# Patient Record
Sex: FEMALE | Race: BLACK OR AFRICAN AMERICAN | NOT HISPANIC OR LATINO | URBAN - METROPOLITAN AREA
[De-identification: names, ages, dates, MRNs, and addresses within clinical notes are randomized per-mention and may not be internally consistent; named-entity substitution may affect disease eponyms.]

---

## 2020-07-25 ENCOUNTER — TELEPHONE ENCOUNTER (OUTPATIENT)
Dept: URBAN - METROPOLITAN AREA CLINIC 13 | Facility: CLINIC | Age: 55
End: 2020-07-25

## 2020-07-25 RX ORDER — FAMOTIDINE 40 MG/1
TAKE 1 TABLET BEDTIME TABLET ORAL
Qty: 30 | Refills: 11 | OUTPATIENT
Start: 2017-08-14 | End: 2019-01-17

## 2020-07-25 RX ORDER — CHOLESTYRAMINE 4 G/9G
MIX 1 SCOOP(5.7 GRAMS) AS DIRECTED AND DRINK TWICE DAILY POWDER, FOR SUSPENSION ORAL
Qty: 378 | Refills: 10 | OUTPATIENT
Start: 2017-10-27 | End: 2019-03-22

## 2020-07-25 RX ORDER — AMITRIPTYLINE HYDROCHLORIDE 10 MG/1
TAKE 1 TABLET AT BEDTIME TABLET, FILM COATED ORAL
Qty: 30 | Refills: 3 | OUTPATIENT
Start: 2016-11-14 | End: 2017-01-12

## 2020-07-25 RX ORDER — HYDROXYZINE HYDROCHLORIDE 10 MG/1
TAKE 1 TABLET 3 TIMES DAILY AS NEEDED TABLET ORAL
Qty: 90 | Refills: 1 | OUTPATIENT
Start: 2017-07-14 | End: 2019-01-17

## 2020-07-25 RX ORDER — OMEPRAZOLE 20 MG/1
TAKE 1 CAPSULE DAILY CAPSULE, DELAYED RELEASE ORAL
Qty: 90 | Refills: 2 | OUTPATIENT
Start: 2018-06-14 | End: 2019-01-17

## 2020-07-25 RX ORDER — CIPROFLOXACIN HYDROCHLORIDE 500 MG/1
TAKE 1 TABLET TWICE DAILY TABLET, FILM COATED ORAL
Qty: 28 | Refills: 0 | OUTPATIENT
Start: 2019-04-18 | End: 2019-10-04

## 2020-07-25 RX ORDER — TIZANIDINE HYDROCHLORIDE 4 MG/1
TAKE 1 TABLET DAILY PRN TABLET ORAL
Refills: 0 | OUTPATIENT
End: 2017-01-12

## 2020-07-25 RX ORDER — CIPROFLOXACIN HYDROCHLORIDE 500 MG/1
TAKE 1 TABLET TWICE DAILY TAKE BY MOUTH TWICE DAILY FOR 14 DAYS TABLET, FILM COATED ORAL
Qty: 28 | Refills: 0 | OUTPATIENT
Start: 2018-06-20 | End: 2019-01-17

## 2020-07-25 RX ORDER — URSODIOL 300 MG/1
TAKE 1 CAPSULE 2-3 TIMES DAILY CAPSULE ORAL
Refills: 0 | OUTPATIENT
End: 2016-07-19

## 2020-07-25 RX ORDER — PREDNISONE 10 MG/1
TAKE 4 TABLET DAILY FOR 2 WEEKS THEN DECREASE 5MG QD PER WEEK TIL FINISHED TABLET ORAL
Qty: 168 | Refills: 0 | OUTPATIENT
Start: 2013-03-20 | End: 2016-01-26

## 2020-07-25 RX ORDER — TRAMADOL HYDROCHLORIDE 50 MG/1
TAKE 1 TABLET DAILY PRN TABLET, COATED ORAL
Refills: 0 | OUTPATIENT
End: 2017-07-14

## 2020-07-25 RX ORDER — NADOLOL 40 MG/1
TAKE 1 TABLET DAILY TABLET ORAL
Qty: 30 | Refills: 10 | OUTPATIENT
Start: 2017-10-27 | End: 2019-01-17

## 2020-07-25 RX ORDER — TAPENTADOL HYDROCHLORIDE 50 MG/1
TK 1 T PO Q 12 H UTD FOR 30 DAYS TABLET, FILM COATED, EXTENDED RELEASE ORAL
Qty: 60 | Refills: 0 | OUTPATIENT
Start: 2018-06-04 | End: 2019-03-22

## 2020-07-25 RX ORDER — PREDNISONE 20 MG/1
TAKE 1 TABLET DAILY AS DIRECTED TABLET ORAL
Qty: 30 | Refills: 0 | OUTPATIENT
Start: 2012-05-24 | End: 2013-03-05

## 2020-07-25 RX ORDER — OBETICHOLIC ACID 5 MG/1
TAKE 1 TABLET DAILY TABLET, FILM COATED ORAL
Qty: 30 | Refills: 3 | OUTPATIENT
Start: 2017-07-14 | End: 2017-10-27

## 2020-07-25 RX ORDER — PREDNISONE 20 MG/1
TAKE 1 TABLET DAILY TABLET ORAL
Qty: 30 | Refills: 2 | OUTPATIENT
Start: 2017-04-07 | End: 2017-10-27

## 2020-07-25 RX ORDER — CIPROFLOXACIN HYDROCHLORIDE 500 MG/1
TAKE 1 TABLET EVERY 12 HOURS DAILY TABLET, FILM COATED ORAL
Qty: 10 | Refills: 0 | OUTPATIENT
Start: 2017-10-06 | End: 2017-10-27

## 2020-07-25 RX ORDER — AMOXICILLIN AND CLAVULANATE POTASSIUM 875; 125 MG/1; MG/1
TAKE 1 TABLET EVERY 12 HOURS DAILY TABLET, FILM COATED ORAL
Qty: 42 | Refills: 0 | OUTPATIENT
Start: 2018-06-14 | End: 2019-01-17

## 2020-07-25 RX ORDER — BUPRENORPHINE HYDROCHLORIDE 75 UG/1
FILM, SOLUBLE BUCCAL
Refills: 0 | OUTPATIENT
End: 2017-04-07

## 2020-07-25 RX ORDER — METRONIDAZOLE 500 MG/1
TAKE 1 TABLET 3 TIMES DAILY UNTIL GONE TABLET ORAL
Qty: 15 | Refills: 0 | OUTPATIENT
Start: 2017-10-06 | End: 2017-10-27

## 2020-07-25 RX ORDER — NAPROXEN SODIUM 220 MG
TAKE 1 TABLET 3 TIMES DAILY AS NEEDED TABLET ORAL
Refills: 0 | OUTPATIENT
End: 2017-07-14

## 2020-07-25 RX ORDER — OXYCODONE AND ACETAMINOPHEN 5; 325 MG/1; MG/1
TAKE TABLET  PRN TABLET ORAL
Refills: 0 | OUTPATIENT
End: 2017-01-12

## 2020-07-25 RX ORDER — POLYETHYLENE GLYCOL 3350, SODIUM CHLORIDE, SODIUM BICARBONATE AND POTASSIUM CHLORIDE WITH LEMON FLAVOR 420; 11.2; 5.72; 1.48 G/4L; G/4L; G/4L; G/4L
TAKE 1/2 GALLON AT 5:00 PM DAY BEFORE PROCEDURE, TAKE SECOND 1/2 OF GALLON 6 HRS PRIOR TO PROCEDURE POWDER, FOR SOLUTION ORAL
Qty: 1 | Refills: 0 | OUTPATIENT
Start: 2019-01-17 | End: 2019-03-22

## 2020-07-25 RX ORDER — HYDROXYZINE HYDROCHLORIDE 25 MG/1
TAKE 1 TABLET 3 TO 4 TIMES DAILY AS NEEDED FOR ITCHING TABLET, FILM COATED ORAL
Qty: 100 | Refills: 3 | OUTPATIENT
Start: 2017-10-27 | End: 2019-01-17

## 2020-07-25 RX ORDER — URSODIOL 500 MG/1
TAKE  TABLET TWICE DAILY TABLET ORAL
Qty: 60 | Refills: 2 | OUTPATIENT
Start: 2012-03-27 | End: 2012-06-07

## 2020-07-25 RX ORDER — POLYETHYLENE GLYCOL 3350, SODIUM SULFATE, SODIUM CHLORIDE, POTASSIUM CHLORIDE, ASCORBIC ACID, SODIUM ASCORBATE 7.5-2.691G
TAKE AS DIRECTED KIT ORAL
Qty: 1 | Refills: 0 | OUTPATIENT
Start: 2012-03-28 | End: 2012-04-12

## 2020-07-25 RX ORDER — PREDNISONE 5 MG/1
TAKE 3 TABLET DAILY 15 MG (3 TAB) DAILY X 1 WEEK, 10 MG (2 TAB) DAILY X 1 WEEK, 5MG (1 TAB) DAILY X 1 WEEK, THEN STOP TABLET ORAL
Qty: 90 | Refills: 1 | OUTPATIENT
Start: 2012-06-07 | End: 2013-03-05

## 2020-07-25 RX ORDER — ONDANSETRON HYDROCHLORIDE 4 MG/1
TAKE 1 TABLET EVERY 6 HOURS PRN TAKE 1-2 TABLETS BY MOUTH EVERY 6 HOURS AS NEEDED FOR NAUSEA TABLET, FILM COATED ORAL
Qty: 40 | Refills: 1 | OUTPATIENT
Start: 2017-11-17 | End: 2019-01-17

## 2020-07-25 RX ORDER — METRONIDAZOLE 500 MG/1
TAKE 1 TABLET 3 TIMES DAILY FOR ONE WEEK TABLET ORAL
Qty: 42 | Refills: 0 | OUTPATIENT
Start: 2019-04-18 | End: 2019-10-04

## 2020-07-26 ENCOUNTER — TELEPHONE ENCOUNTER (OUTPATIENT)
Dept: URBAN - METROPOLITAN AREA CLINIC 13 | Facility: CLINIC | Age: 55
End: 2020-07-26

## 2020-07-26 RX ORDER — TACROLIMUS 0.3 MG/G
APP TO HANDS AND FEET BID PRN OINTMENT TOPICAL
Qty: 30 | Refills: 0 | Status: ACTIVE | COMMUNITY
Start: 2018-09-19

## 2020-07-26 RX ORDER — DUPILUMAB 300 MG/2ML
TAKES ONCE EVERY 2 WEEKS INJECTION, SOLUTION SUBCUTANEOUS
Refills: 0 | Status: ACTIVE | COMMUNITY
Start: 2019-02-16

## 2020-07-26 RX ORDER — NADOLOL 40 MG/1
TAKE 1 TABLET BY MOUTH DAILY TABLET ORAL
Qty: 90 | Refills: 3 | Status: ACTIVE | COMMUNITY
Start: 2019-10-04

## 2020-07-26 RX ORDER — DUPILUMAB 300 MG/2ML
INJECTION, SOLUTION SUBCUTANEOUS
Qty: 4 | Refills: 0 | Status: ACTIVE | COMMUNITY
Start: 2018-12-07

## 2020-07-26 RX ORDER — IBUPROFEN 800 MG/1
TAKE TABLET  PRN TABLET ORAL
Refills: 0 | Status: ACTIVE | COMMUNITY
Start: 2018-02-25

## 2020-07-26 RX ORDER — MOMETASONE FUROATE 1 MG/ML
SOLUTION TOPICAL
Qty: 60 | Refills: 0 | Status: ACTIVE | COMMUNITY
Start: 2012-02-17

## 2020-07-26 RX ORDER — SPIRONOLACTONE 50 MG/1
TABLET, FILM COATED ORAL
Qty: 90 | Refills: 0 | Status: ACTIVE | COMMUNITY
Start: 2018-01-18

## 2020-07-26 RX ORDER — CODEINE PHOSPHATE AND GUAIFENESIN 10; 100 MG/5ML; MG/5ML
LIQUID ORAL
Qty: 200 | Refills: 0 | Status: ACTIVE | COMMUNITY
Start: 2019-10-16

## 2020-07-26 RX ORDER — TERBINAFINE HYDROCHLORIDE 250 MG/1
TABLET ORAL
Qty: 30 | Refills: 0 | Status: ACTIVE | COMMUNITY
Start: 2019-08-09

## 2020-07-26 RX ORDER — CHOLECALCIFEROL (VITAMIN D3) 1250 MCG
TK 1 C PO 1 TIME Q WK CAPSULE ORAL
Qty: 4 | Refills: 0 | Status: ACTIVE | COMMUNITY
Start: 2019-07-10

## 2020-07-26 RX ORDER — DUPILUMAB 300 MG/2ML
INJECTION, SOLUTION SUBCUTANEOUS
Qty: 4 | Refills: 0 | Status: ACTIVE | COMMUNITY
Start: 2018-11-14

## 2020-07-26 RX ORDER — EVOLOCUMAB 140 MG/ML
BI WEEKLY INJECTION, SOLUTION SUBCUTANEOUS
Refills: 0 | Status: ACTIVE | COMMUNITY

## 2020-07-26 RX ORDER — URSODIOL 300 MG/1
TAKE ONE CAPSULE BY MOUTH THREE TIMES DAILY CAPSULE ORAL
Qty: 270 | Refills: 3 | Status: ACTIVE | COMMUNITY
Start: 2016-02-03

## 2020-07-26 RX ORDER — EVOLOCUMAB 140 MG/ML
INJECTION, SOLUTION SUBCUTANEOUS
Qty: 6 | Refills: 0 | Status: ACTIVE | COMMUNITY
Start: 2018-04-03

## 2020-07-26 RX ORDER — PREDNISONE 10 MG/1
TABLET ORAL
Qty: 21 | Refills: 0 | Status: ACTIVE | COMMUNITY
Start: 2018-01-30

## 2020-07-26 RX ORDER — EVOLOCUMAB 140 MG/ML
INJECTION, SOLUTION SUBCUTANEOUS
Qty: 6 | Refills: 0 | Status: ACTIVE | COMMUNITY
Start: 2018-10-10

## 2020-07-26 RX ORDER — HYDROXYZINE HYDROCHLORIDE 50 MG/1
TAKE 1 TABLET EVERY 8 HOURS PRN FOR ITCHING TABLET, FILM COATED ORAL
Qty: 45 | Refills: 1 | Status: ACTIVE | COMMUNITY
Start: 2020-01-19

## 2020-07-26 RX ORDER — OXYCODONE HYDROCHLORIDE 5 MG/1
TK 1 T PO Q 12 H AS DIRECTED FOR 30 DAYS TABLET ORAL
Qty: 30 | Refills: 0 | Status: ACTIVE | COMMUNITY
Start: 2018-07-30

## 2020-07-26 RX ORDER — CEFUROXIME AXETIL 250 MG/1
TABLET ORAL
Qty: 20 | Refills: 0 | Status: ACTIVE | COMMUNITY
Start: 2018-01-30

## 2020-07-26 RX ORDER — BENZONATATE 100 MG/1
CAPSULE ORAL
Qty: 20 | Refills: 0 | Status: ACTIVE | COMMUNITY
Start: 2018-01-30

## 2020-07-26 RX ORDER — TACROLIMUS 0.3 MG/G
OINTMENT TOPICAL
Qty: 30 | Refills: 0 | Status: ACTIVE | COMMUNITY
Start: 2018-10-01

## 2020-07-26 RX ORDER — OMEPRAZOLE 20 MG/1
TAKE 1 CAPSULE DAILY CAPSULE, DELAYED RELEASE ORAL
Qty: 90 | Refills: 2 | Status: ACTIVE | COMMUNITY
Start: 2019-10-04

## 2020-07-26 RX ORDER — EVOLOCUMAB 140 MG/ML
INJECTION, SOLUTION SUBCUTANEOUS
Qty: 6 | Refills: 0 | Status: ACTIVE | COMMUNITY
Start: 2018-07-16

## 2020-07-26 RX ORDER — NITROFURANTOIN MONOHYDRATE/MACROCRYSTALLINE 25; 75 MG/1; MG/1
CAPSULE ORAL
Qty: 14 | Refills: 0 | Status: ACTIVE | COMMUNITY
Start: 2011-12-28

## 2020-07-26 RX ORDER — MINOCYCLINE HYDROCHLORIDE 100 MG/1
CAPSULE ORAL
Qty: 44 | Refills: 0 | Status: ACTIVE | COMMUNITY
Start: 2018-03-26

## 2020-07-26 RX ORDER — EVOLOCUMAB 140 MG/ML
INJECTION, SOLUTION SUBCUTANEOUS
Qty: 2 | Refills: 0 | Status: ACTIVE | COMMUNITY
Start: 2019-02-16

## 2020-07-26 RX ORDER — TRIAMCINOLONE ACETONIDE 1 MG/G
CREAM TOPICAL
Qty: 454 | Refills: 0 | Status: ACTIVE | COMMUNITY
Start: 2019-08-09

## 2020-07-26 RX ORDER — CIPROFLOXACIN HYDROCHLORIDE 500 MG/1
TAKE 1 TABLET TWICE DAILY TABLET, FILM COATED ORAL
Qty: 28 | Refills: 0 | Status: ACTIVE | COMMUNITY
Start: 2020-01-19

## 2020-07-26 RX ORDER — CLOBETASOL PROPIONATE 0.5 MG/G
CREAM TOPICAL
Qty: 15 | Refills: 0 | Status: ACTIVE | COMMUNITY
Start: 2018-08-30

## 2020-07-26 RX ORDER — METRONIDAZOLE 500 MG/1
TAKE 1 TABLET 3 TIMES DAILY TABLET ORAL
Qty: 30 | Refills: 0 | Status: ACTIVE | COMMUNITY
Start: 2020-01-03

## 2020-07-26 RX ORDER — TRIAMCINOLONE ACETONIDE 1 MG/G
CREAM TOPICAL
Qty: 454 | Refills: 0 | Status: ACTIVE | COMMUNITY
Start: 2019-03-14

## 2020-07-26 RX ORDER — AZITHROMYCIN DIHYDRATE 250 MG/1
TABLET, FILM COATED ORAL
Qty: 6 | Refills: 0 | Status: ACTIVE | COMMUNITY
Start: 2012-02-22

## 2020-07-26 RX ORDER — DUPILUMAB 300 MG/2ML
INJECTION, SOLUTION SUBCUTANEOUS
Qty: 4 | Refills: 0 | Status: ACTIVE | COMMUNITY
Start: 2019-01-05

## 2020-08-17 ENCOUNTER — TELEPHONE ENCOUNTER (OUTPATIENT)
Dept: URBAN - METROPOLITAN AREA CLINIC 113 | Facility: CLINIC | Age: 55
End: 2020-08-17

## 2020-08-18 ENCOUNTER — TELEPHONE ENCOUNTER (OUTPATIENT)
Dept: URBAN - METROPOLITAN AREA CLINIC 113 | Facility: CLINIC | Age: 55
End: 2020-08-18

## 2020-08-19 ENCOUNTER — OFFICE VISIT (OUTPATIENT)
Dept: URBAN - METROPOLITAN AREA CLINIC 113 | Facility: CLINIC | Age: 55
End: 2020-08-19
Payer: COMMERCIAL

## 2020-08-19 VITALS
WEIGHT: 210 LBS | TEMPERATURE: 98.5 F | HEIGHT: 67 IN | DIASTOLIC BLOOD PRESSURE: 83 MMHG | SYSTOLIC BLOOD PRESSURE: 126 MMHG | BODY MASS INDEX: 32.96 KG/M2 | HEART RATE: 83 BPM

## 2020-08-19 DIAGNOSIS — K83.01 PRIMARY SCLEROSING CHOLANGITIS: ICD-10-CM

## 2020-08-19 PROCEDURE — 99214 OFFICE O/P EST MOD 30 MIN: CPT | Performed by: NURSE PRACTITIONER

## 2020-08-19 PROCEDURE — G8427 DOCREV CUR MEDS BY ELIG CLIN: HCPCS | Performed by: NURSE PRACTITIONER

## 2020-08-19 RX ORDER — CEFUROXIME AXETIL 250 MG/1
TABLET ORAL
Qty: 20 | Refills: 0 | Status: ON HOLD | COMMUNITY
Start: 2018-01-30

## 2020-08-19 RX ORDER — NADOLOL 40 MG/1
TAKE 1 TABLET BY MOUTH DAILY TABLET ORAL
Qty: 90 | Refills: 3 | Status: ON HOLD | COMMUNITY
Start: 2019-10-04

## 2020-08-19 RX ORDER — OXYCODONE HYDROCHLORIDE 5 MG/1
TK 1 T PO Q 12 H AS DIRECTED FOR 30 DAYS TABLET ORAL
Qty: 30 | Refills: 0 | Status: ON HOLD | COMMUNITY
Start: 2018-07-30

## 2020-08-19 RX ORDER — HYDROXYZINE HYDROCHLORIDE 50 MG/1
TAKE 1 TABLET EVERY 8 HOURS PRN FOR ITCHING TABLET, FILM COATED ORAL
Qty: 45 | Refills: 1 | Status: ON HOLD | COMMUNITY
Start: 2020-01-19

## 2020-08-19 RX ORDER — BENZONATATE 100 MG/1
CAPSULE ORAL
Qty: 20 | Refills: 0 | Status: ON HOLD | COMMUNITY
Start: 2018-01-30

## 2020-08-19 RX ORDER — CHOLECALCIFEROL (VITAMIN D3) 1250 MCG
TK 1 C PO 1 TIME Q WK CAPSULE ORAL
Qty: 4 | Refills: 0 | Status: ON HOLD | COMMUNITY
Start: 2019-07-10

## 2020-08-19 RX ORDER — OMEPRAZOLE 20 MG/1
TAKE 1 CAPSULE DAILY CAPSULE, DELAYED RELEASE ORAL
Qty: 90 | Refills: 2 | Status: ON HOLD | COMMUNITY
Start: 2019-10-04

## 2020-08-19 RX ORDER — METRONIDAZOLE 500 MG/1
1 TABLET TABLET ORAL THREE TIMES A DAY
Qty: 30 TABLET | Refills: 0 | OUTPATIENT
Start: 2020-08-19 | End: 2020-08-29

## 2020-08-19 RX ORDER — MINOCYCLINE HYDROCHLORIDE 100 MG/1
CAPSULE ORAL
Qty: 44 | Refills: 0 | Status: ON HOLD | COMMUNITY
Start: 2018-03-26

## 2020-08-19 RX ORDER — AZITHROMYCIN DIHYDRATE 250 MG/1
TABLET, FILM COATED ORAL
Qty: 6 | Refills: 0 | Status: ON HOLD | COMMUNITY
Start: 2012-02-22

## 2020-08-19 RX ORDER — SPIRONOLACTONE 50 MG/1
TABLET, FILM COATED ORAL
Qty: 90 | Refills: 0 | Status: ON HOLD | COMMUNITY
Start: 2018-01-18

## 2020-08-19 RX ORDER — TRIAMCINOLONE ACETONIDE 1 MG/G
CREAM TOPICAL
Qty: 454 | Refills: 0 | Status: ON HOLD | COMMUNITY
Start: 2019-03-14

## 2020-08-19 RX ORDER — NITROFURANTOIN MONOHYDRATE/MACROCRYSTALLINE 25; 75 MG/1; MG/1
CAPSULE ORAL
Qty: 14 | Refills: 0 | Status: ON HOLD | COMMUNITY
Start: 2011-12-28

## 2020-08-19 RX ORDER — CIPROFLOXACIN HCL 500 MG
1 TABLET TABLET ORAL
Qty: 20 TABLET | Refills: 0 | OUTPATIENT
Start: 2020-08-19 | End: 2020-08-29

## 2020-08-19 RX ORDER — CIPROFLOXACIN HYDROCHLORIDE 500 MG/1
TAKE 1 TABLET TWICE DAILY TABLET, FILM COATED ORAL
Qty: 28 | Refills: 0 | Status: ON HOLD | COMMUNITY
Start: 2020-01-19

## 2020-08-19 RX ORDER — PREDNISONE 10 MG/1
TABLET ORAL
Qty: 21 | Refills: 0 | Status: ON HOLD | COMMUNITY
Start: 2018-01-30

## 2020-08-19 RX ORDER — EVOLOCUMAB 140 MG/ML
INJECTION, SOLUTION SUBCUTANEOUS
Qty: 6 | Refills: 0 | Status: ON HOLD | COMMUNITY
Start: 2018-04-03

## 2020-08-19 RX ORDER — CODEINE PHOSPHATE AND GUAIFENESIN 10; 100 MG/5ML; MG/5ML
LIQUID ORAL
Qty: 200 | Refills: 0 | Status: ON HOLD | COMMUNITY
Start: 2019-10-16

## 2020-08-19 RX ORDER — EVOLOCUMAB 140 MG/ML
BI WEEKLY INJECTION, SOLUTION SUBCUTANEOUS
Refills: 0 | Status: ON HOLD | COMMUNITY

## 2020-08-19 RX ORDER — METRONIDAZOLE 500 MG/1
TAKE 1 TABLET 3 TIMES DAILY TABLET ORAL
Qty: 30 | Refills: 0 | Status: ON HOLD | COMMUNITY
Start: 2020-01-03

## 2020-08-19 RX ORDER — DUPILUMAB 300 MG/2ML
INJECTION, SOLUTION SUBCUTANEOUS
Qty: 4 | Refills: 0 | Status: ON HOLD | COMMUNITY
Start: 2018-11-14

## 2020-08-19 RX ORDER — TERBINAFINE HYDROCHLORIDE 250 MG/1
TABLET ORAL
Qty: 30 | Refills: 0 | Status: ON HOLD | COMMUNITY
Start: 2019-08-09

## 2020-08-19 RX ORDER — URSODIOL 300 MG/1
TAKE ONE CAPSULE BY MOUTH THREE TIMES DAILY CAPSULE ORAL
Qty: 270 | Refills: 3 | Status: ON HOLD | COMMUNITY
Start: 2016-02-03

## 2020-08-19 RX ORDER — MOMETASONE FUROATE 1 MG/ML
SOLUTION TOPICAL
Qty: 60 | Refills: 0 | Status: ON HOLD | COMMUNITY
Start: 2012-02-17

## 2020-08-19 RX ORDER — TACROLIMUS 0.3 MG/G
APP TO HANDS AND FEET BID PRN OINTMENT TOPICAL
Qty: 30 | Refills: 0 | Status: ON HOLD | COMMUNITY
Start: 2018-09-19

## 2020-08-19 RX ORDER — IBUPROFEN 800 MG/1
TAKE TABLET  PRN TABLET ORAL
Refills: 0 | Status: ON HOLD | COMMUNITY
Start: 2018-02-25

## 2020-08-19 RX ORDER — CLOBETASOL PROPIONATE 0.5 MG/G
CREAM TOPICAL
Qty: 15 | Refills: 0 | Status: ON HOLD | COMMUNITY
Start: 2018-08-30

## 2020-08-19 NOTE — HPI-TODAY'S VISIT:
Ms Carlos is a 55-year-old woman with a history of primary sclerosing cholangitis, presenting for evaluation of fatigue. She started to experience fatigue this past Friday, which has since resolved. She was also experiencing some colitis symptoms, which have also resolved. There is no blood per rectum. No nausea or vomiting. No abdominal pain. Weight is stable. She last saw the [facility] in December 2019; she is scheduled to follow up with them next month. She reports that she is scheduled for MRI abdomen with contrast on September. Her last ERCP was in April 2019 with Dr. Price. She is no longer taking ursodiol at the direction of Dr. Gayle Pitts at [facility]. She stopped this sometime in 2019. Additionally, she reports noticing icterus.  Labs 8/17/2020 : WBC 6.31, hemoglobin 12.6, hematocrit 36.8, MCV 82.1, platelets 124, BUN 17, creatinine 0.9, sodium 136, potassium 4.3, T bili 6, , ALT 94,

## 2020-08-20 ENCOUNTER — LAB OUTSIDE AN ENCOUNTER (OUTPATIENT)
Dept: URBAN - METROPOLITAN AREA CLINIC 113 | Facility: CLINIC | Age: 55
End: 2020-08-20

## 2020-08-21 ENCOUNTER — TELEPHONE ENCOUNTER (OUTPATIENT)
Dept: URBAN - METROPOLITAN AREA CLINIC 113 | Facility: CLINIC | Age: 55
End: 2020-08-21

## 2020-09-02 ENCOUNTER — OFFICE VISIT (OUTPATIENT)
Dept: URBAN - METROPOLITAN AREA MEDICAL CENTER 2 | Facility: MEDICAL CENTER | Age: 55
End: 2020-09-02
Payer: COMMERCIAL

## 2020-09-02 DIAGNOSIS — K83.09 SCLEROSING CHOLANGITIS: ICD-10-CM

## 2020-09-02 DIAGNOSIS — I86.4 GASTRIC VARIX: ICD-10-CM

## 2020-09-02 PROCEDURE — 43260 ERCP W/SPECIMEN COLLECTION: CPT | Performed by: INTERNAL MEDICINE

## 2020-09-03 ENCOUNTER — TELEPHONE ENCOUNTER (OUTPATIENT)
Dept: URBAN - METROPOLITAN AREA CLINIC 113 | Facility: CLINIC | Age: 55
End: 2020-09-03

## 2020-09-03 RX ORDER — CEFUROXIME AXETIL 250 MG/1
TABLET ORAL
Qty: 20 | Refills: 0 | Status: ON HOLD | COMMUNITY
Start: 2018-01-30

## 2020-09-03 RX ORDER — OXYCODONE HYDROCHLORIDE 5 MG/1
TK 1 T PO Q 12 H AS DIRECTED FOR 30 DAYS TABLET ORAL
Qty: 30 | Refills: 0 | Status: ON HOLD | COMMUNITY
Start: 2018-07-30

## 2020-09-03 RX ORDER — CLOBETASOL PROPIONATE 0.5 MG/G
CREAM TOPICAL
Qty: 15 | Refills: 0 | Status: ON HOLD | COMMUNITY
Start: 2018-08-30

## 2020-09-03 RX ORDER — PREDNISONE 10 MG/1
TABLET ORAL
Qty: 21 | Refills: 0 | Status: ON HOLD | COMMUNITY
Start: 2018-01-30

## 2020-09-03 RX ORDER — MINOCYCLINE HYDROCHLORIDE 100 MG/1
CAPSULE ORAL
Qty: 44 | Refills: 0 | Status: ON HOLD | COMMUNITY
Start: 2018-03-26

## 2020-09-03 RX ORDER — SPIRONOLACTONE 50 MG/1
TABLET, FILM COATED ORAL
Qty: 90 | Refills: 0 | Status: ON HOLD | COMMUNITY
Start: 2018-01-18

## 2020-09-03 RX ORDER — OMEPRAZOLE 20 MG/1
TAKE 1 CAPSULE DAILY CAPSULE, DELAYED RELEASE ORAL
Qty: 90 | Refills: 2 | Status: ON HOLD | COMMUNITY
Start: 2019-10-04

## 2020-09-03 RX ORDER — EVOLOCUMAB 140 MG/ML
BI WEEKLY INJECTION, SOLUTION SUBCUTANEOUS
Refills: 0 | Status: ON HOLD | COMMUNITY

## 2020-09-03 RX ORDER — MOMETASONE FUROATE 1 MG/ML
SOLUTION TOPICAL
Qty: 60 | Refills: 0 | Status: ON HOLD | COMMUNITY
Start: 2012-02-17

## 2020-09-03 RX ORDER — NADOLOL 40 MG/1
TAKE 1 TABLET BY MOUTH DAILY TABLET ORAL
Qty: 90 | Refills: 3 | Status: ON HOLD | COMMUNITY
Start: 2019-10-04

## 2020-09-03 RX ORDER — NITROFURANTOIN MONOHYDRATE/MACROCRYSTALLINE 25; 75 MG/1; MG/1
CAPSULE ORAL
Qty: 14 | Refills: 0 | Status: ON HOLD | COMMUNITY
Start: 2011-12-28

## 2020-09-03 RX ORDER — METRONIDAZOLE 500 MG/1
TAKE 1 TABLET 3 TIMES DAILY TABLET ORAL
Qty: 30 | Refills: 0 | Status: ON HOLD | COMMUNITY
Start: 2020-01-03

## 2020-09-03 RX ORDER — TACROLIMUS 0.3 MG/G
APP TO HANDS AND FEET BID PRN OINTMENT TOPICAL
Qty: 30 | Refills: 0 | Status: ON HOLD | COMMUNITY
Start: 2018-09-19

## 2020-09-03 RX ORDER — CHOLECALCIFEROL (VITAMIN D3) 1250 MCG
TK 1 C PO 1 TIME Q WK CAPSULE ORAL
Qty: 4 | Refills: 0 | Status: ON HOLD | COMMUNITY
Start: 2019-07-10

## 2020-09-03 RX ORDER — URSODIOL 300 MG/1
1 TABLET CAPSULE ORAL
Qty: 90 | Refills: 1 | OUTPATIENT
Start: 2020-09-03

## 2020-09-03 RX ORDER — AMITRIPTYLINE HYDROCHLORIDE 25 MG/1
1 TABLET AT BEDTIME TABLET, FILM COATED ORAL ONCE A DAY
Qty: 30 TABLET | Refills: 1 | OUTPATIENT
Start: 2020-09-03

## 2020-09-03 RX ORDER — TRIAMCINOLONE ACETONIDE 1 MG/G
CREAM TOPICAL
Qty: 454 | Refills: 0 | Status: ON HOLD | COMMUNITY
Start: 2019-03-14

## 2020-09-03 RX ORDER — EVOLOCUMAB 140 MG/ML
INJECTION, SOLUTION SUBCUTANEOUS
Qty: 6 | Refills: 0 | Status: ON HOLD | COMMUNITY
Start: 2018-04-03

## 2020-09-03 RX ORDER — AZITHROMYCIN DIHYDRATE 250 MG/1
TABLET, FILM COATED ORAL
Qty: 6 | Refills: 0 | Status: ON HOLD | COMMUNITY
Start: 2012-02-22

## 2020-09-03 RX ORDER — URSODIOL 300 MG/1
TAKE ONE CAPSULE BY MOUTH THREE TIMES DAILY CAPSULE ORAL
Qty: 270 | Refills: 3 | Status: ON HOLD | COMMUNITY
Start: 2016-02-03

## 2020-09-03 RX ORDER — HYDROXYZINE HYDROCHLORIDE 50 MG/1
TAKE 1 TABLET EVERY 8 HOURS PRN FOR ITCHING TABLET, FILM COATED ORAL
Qty: 45 | Refills: 1 | Status: ON HOLD | COMMUNITY
Start: 2020-01-19

## 2020-09-03 RX ORDER — CODEINE PHOSPHATE AND GUAIFENESIN 10; 100 MG/5ML; MG/5ML
LIQUID ORAL
Qty: 200 | Refills: 0 | Status: ON HOLD | COMMUNITY
Start: 2019-10-16

## 2020-09-03 RX ORDER — DUPILUMAB 300 MG/2ML
INJECTION, SOLUTION SUBCUTANEOUS
Qty: 4 | Refills: 0 | Status: ON HOLD | COMMUNITY
Start: 2018-11-14

## 2020-09-03 RX ORDER — BENZONATATE 100 MG/1
CAPSULE ORAL
Qty: 20 | Refills: 0 | Status: ON HOLD | COMMUNITY
Start: 2018-01-30

## 2020-09-03 RX ORDER — CIPROFLOXACIN HYDROCHLORIDE 500 MG/1
TAKE 1 TABLET TWICE DAILY TABLET, FILM COATED ORAL
Qty: 28 | Refills: 0 | Status: ON HOLD | COMMUNITY
Start: 2020-01-19

## 2020-09-03 RX ORDER — IBUPROFEN 800 MG/1
TAKE TABLET  PRN TABLET ORAL
Refills: 0 | Status: ON HOLD | COMMUNITY
Start: 2018-02-25

## 2020-09-03 RX ORDER — TERBINAFINE HYDROCHLORIDE 250 MG/1
TABLET ORAL
Qty: 30 | Refills: 0 | Status: ON HOLD | COMMUNITY
Start: 2019-08-09

## 2020-09-17 ENCOUNTER — TELEPHONE ENCOUNTER (OUTPATIENT)
Dept: URBAN - METROPOLITAN AREA CLINIC 113 | Facility: CLINIC | Age: 55
End: 2020-09-17

## 2020-10-07 ENCOUNTER — TELEPHONE ENCOUNTER (OUTPATIENT)
Dept: URBAN - METROPOLITAN AREA CLINIC 113 | Facility: CLINIC | Age: 55
End: 2020-10-07

## 2020-10-19 ENCOUNTER — TELEPHONE ENCOUNTER (OUTPATIENT)
Dept: URBAN - METROPOLITAN AREA CLINIC 113 | Facility: CLINIC | Age: 55
End: 2020-10-19

## 2020-11-24 ENCOUNTER — TELEPHONE ENCOUNTER (OUTPATIENT)
Dept: URBAN - METROPOLITAN AREA CLINIC 113 | Facility: CLINIC | Age: 55
End: 2020-11-24

## 2020-12-04 ENCOUNTER — TELEPHONE ENCOUNTER (OUTPATIENT)
Dept: URBAN - METROPOLITAN AREA CLINIC 113 | Facility: CLINIC | Age: 55
End: 2020-12-04

## 2020-12-07 ENCOUNTER — TELEPHONE ENCOUNTER (OUTPATIENT)
Dept: URBAN - METROPOLITAN AREA CLINIC 113 | Facility: CLINIC | Age: 55
End: 2020-12-07

## 2020-12-07 RX ORDER — URSODIOL 300 MG/1
1 TABLET CAPSULE ORAL
Qty: 90 | Refills: 1
Start: 2020-09-03

## 2020-12-08 ENCOUNTER — LAB OUTSIDE AN ENCOUNTER (OUTPATIENT)
Dept: URBAN - METROPOLITAN AREA CLINIC 113 | Facility: CLINIC | Age: 55
End: 2020-12-08

## 2020-12-08 ENCOUNTER — TELEPHONE ENCOUNTER (OUTPATIENT)
Dept: URBAN - METROPOLITAN AREA CLINIC 113 | Facility: CLINIC | Age: 55
End: 2020-12-08

## 2020-12-24 ENCOUNTER — TELEPHONE ENCOUNTER (OUTPATIENT)
Dept: URBAN - METROPOLITAN AREA CLINIC 113 | Facility: CLINIC | Age: 55
End: 2020-12-24

## 2021-01-13 ENCOUNTER — TELEPHONE ENCOUNTER (OUTPATIENT)
Dept: URBAN - METROPOLITAN AREA CLINIC 113 | Facility: CLINIC | Age: 56
End: 2021-01-13

## 2021-01-13 RX ORDER — TRAZODONE HYDROCHLORIDE 50 MG/1
1 TABLET AT BEDTIME AS NEEDED TABLET, FILM COATED ORAL ONCE A DAY
Qty: 30 TABLET | Refills: 3 | OUTPATIENT

## 2021-01-26 ENCOUNTER — TELEPHONE ENCOUNTER (OUTPATIENT)
Dept: URBAN - METROPOLITAN AREA CLINIC 23 | Facility: CLINIC | Age: 56
End: 2021-01-26

## 2021-04-05 ENCOUNTER — ERX REFILL RESPONSE (OUTPATIENT)
Dept: URBAN - METROPOLITAN AREA CLINIC 113 | Facility: CLINIC | Age: 56
End: 2021-04-05

## 2021-04-05 RX ORDER — URSODIOL 300 MG/1
1 TABLET CAPSULE ORAL
Qty: 270 TABLET | Refills: 3

## 2021-04-26 ENCOUNTER — TELEPHONE ENCOUNTER (OUTPATIENT)
Dept: URBAN - METROPOLITAN AREA CLINIC 113 | Facility: CLINIC | Age: 56
End: 2021-04-26

## 2021-04-28 ENCOUNTER — CLAIMS CREATED FROM THE CLAIM WINDOW (OUTPATIENT)
Dept: URBAN - METROPOLITAN AREA MEDICAL CENTER 43 | Facility: MEDICAL CENTER | Age: 56
End: 2021-04-28
Payer: COMMERCIAL

## 2021-04-28 ENCOUNTER — TELEPHONE ENCOUNTER (OUTPATIENT)
Dept: URBAN - METROPOLITAN AREA CLINIC 113 | Facility: CLINIC | Age: 56
End: 2021-04-28

## 2021-04-28 DIAGNOSIS — R53.83 OTHER FATIGUE: ICD-10-CM

## 2021-04-28 DIAGNOSIS — N17.8 OTHER ACUTE KIDNEY FAILURE: ICD-10-CM

## 2021-04-28 DIAGNOSIS — K83.01 PRIMARY SCLEROSING CHOLANGITIS: ICD-10-CM

## 2021-04-28 DIAGNOSIS — R79.89 AZOTEMIA: ICD-10-CM

## 2021-04-28 LAB
A/G RATIO: 0.7
ALBUMIN: 3.3
ALKALINE PHOSPHATASE: 711
ALT (SGPT): 52
AST (SGOT): 98
BASO (ABSOLUTE): 0
BASOS: 0
BILIRUBIN, TOTAL: 3.8
BUN/CREATININE RATIO: 11
BUN: 37
CA 19-9: 135
CALCIUM: 8.6
CARBON DIOXIDE, TOTAL: 19
CHLORIDE: 108
CREATININE: 3.41
EGFR IF AFRICN AM: 17
EGFR IF NONAFRICN AM: 15
EOS (ABSOLUTE): 0.1
EOS: 2
GLOBULIN, TOTAL: 4.8
GLUCOSE: 103
HEMATOCRIT: 32.9
HEMATOLOGY COMMENTS:: (no result)
HEMOGLOBIN: 10.8
IMMATURE CELLS: (no result)
IMMATURE GRANS (ABS): 0.1
IMMATURE GRANULOCYTES: 1
INR: 1.1
LYMPHS (ABSOLUTE): 1
LYMPHS: 14
MCH: 28.1
MCHC: 32.8
MCV: 86
MONOCYTES(ABSOLUTE): 0.8
MONOCYTES: 11
NEUTROPHILS (ABSOLUTE): 5.1
NEUTROPHILS: 72
NRBC: (no result)
PLATELETS: 142
POTASSIUM: 4.9
PROTEIN, TOTAL: 8.1
PROTHROMBIN TIME: 11.4
RBC: 3.85
RDW: 14.6
SODIUM: 141
WBC: 7

## 2021-04-28 PROCEDURE — 99254 IP/OBS CNSLTJ NEW/EST MOD 60: CPT | Performed by: INTERNAL MEDICINE

## 2021-04-29 ENCOUNTER — CLAIMS CREATED FROM THE CLAIM WINDOW (OUTPATIENT)
Dept: URBAN - METROPOLITAN AREA MEDICAL CENTER 43 | Facility: MEDICAL CENTER | Age: 56
End: 2021-04-29
Payer: COMMERCIAL

## 2021-04-29 DIAGNOSIS — K83.01 PRIMARY SCLEROSING CHOLANGITIS: ICD-10-CM

## 2021-04-29 DIAGNOSIS — R79.89 AZOTEMIA: ICD-10-CM

## 2021-04-29 DIAGNOSIS — N17.8 OTHER ACUTE KIDNEY FAILURE: ICD-10-CM

## 2021-04-29 PROCEDURE — 99232 SBSQ HOSP IP/OBS MODERATE 35: CPT | Performed by: INTERNAL MEDICINE

## 2021-04-30 ENCOUNTER — CLAIMS CREATED FROM THE CLAIM WINDOW (OUTPATIENT)
Dept: URBAN - METROPOLITAN AREA MEDICAL CENTER 43 | Facility: MEDICAL CENTER | Age: 56
End: 2021-04-30
Payer: COMMERCIAL

## 2021-04-30 DIAGNOSIS — K83.01 PRIMARY SCLEROSING CHOLANGITIS: ICD-10-CM

## 2021-04-30 DIAGNOSIS — N17.8 OTHER ACUTE KIDNEY FAILURE: ICD-10-CM

## 2021-04-30 DIAGNOSIS — R79.89 AZOTEMIA: ICD-10-CM

## 2021-04-30 PROCEDURE — 99232 SBSQ HOSP IP/OBS MODERATE 35: CPT | Performed by: INTERNAL MEDICINE

## 2021-05-31 ENCOUNTER — CLAIMS CREATED FROM THE CLAIM WINDOW (OUTPATIENT)
Dept: URBAN - METROPOLITAN AREA MEDICAL CENTER 43 | Facility: MEDICAL CENTER | Age: 56
End: 2021-05-31
Payer: COMMERCIAL

## 2021-05-31 DIAGNOSIS — N17.8 OTHER ACUTE KIDNEY FAILURE: ICD-10-CM

## 2021-05-31 DIAGNOSIS — K74.3 CIRRHOSIS, PRIMARY BILIARY: ICD-10-CM

## 2021-05-31 DIAGNOSIS — R74.8 ABNORMAL LEVELS OF OTHER SERUM ENZYMES: ICD-10-CM

## 2021-05-31 DIAGNOSIS — K83.1 AMPULLARY STENOSIS: ICD-10-CM

## 2021-05-31 DIAGNOSIS — K83.01 PRIMARY SCLEROSING CHOLANGITIS: ICD-10-CM

## 2021-05-31 DIAGNOSIS — R17 UNSPECIFIED JAUNDICE: ICD-10-CM

## 2021-05-31 PROCEDURE — 99254 IP/OBS CNSLTJ NEW/EST MOD 60: CPT | Performed by: INTERNAL MEDICINE

## 2021-06-01 ENCOUNTER — CLAIMS CREATED FROM THE CLAIM WINDOW (OUTPATIENT)
Dept: URBAN - METROPOLITAN AREA MEDICAL CENTER 43 | Facility: MEDICAL CENTER | Age: 56
End: 2021-06-01
Payer: COMMERCIAL

## 2021-06-01 DIAGNOSIS — R17 UNSPECIFIED JAUNDICE: ICD-10-CM

## 2021-06-01 DIAGNOSIS — K83.01 PRIMARY SCLEROSING CHOLANGITIS: ICD-10-CM

## 2021-06-01 DIAGNOSIS — K83.1 OBSTRUCTION OF BILE DUCT: ICD-10-CM

## 2021-06-01 DIAGNOSIS — K74.3 PRIMARY BILIARY CIRRHOSIS: ICD-10-CM

## 2021-06-01 DIAGNOSIS — N17.8 OTHER ACUTE KIDNEY FAILURE: ICD-10-CM

## 2021-06-01 PROCEDURE — 99232 SBSQ HOSP IP/OBS MODERATE 35: CPT | Performed by: INTERNAL MEDICINE

## 2021-09-17 ENCOUNTER — TELEPHONE ENCOUNTER (OUTPATIENT)
Dept: URBAN - METROPOLITAN AREA CLINIC 113 | Facility: CLINIC | Age: 56
End: 2021-09-17

## 2021-09-21 ENCOUNTER — TELEPHONE ENCOUNTER (OUTPATIENT)
Dept: URBAN - METROPOLITAN AREA CLINIC 113 | Facility: CLINIC | Age: 56
End: 2021-09-21

## 2021-10-15 ENCOUNTER — TELEPHONE ENCOUNTER (OUTPATIENT)
Dept: URBAN - METROPOLITAN AREA CLINIC 113 | Facility: CLINIC | Age: 56
End: 2021-10-15

## 2021-11-05 ENCOUNTER — OFFICE VISIT (OUTPATIENT)
Dept: URBAN - METROPOLITAN AREA CLINIC 113 | Facility: CLINIC | Age: 56
End: 2021-11-05

## 2021-11-05 RX ORDER — CHOLECALCIFEROL (VITAMIN D3) 1250 MCG
TK 1 C PO 1 TIME Q WK CAPSULE ORAL
Qty: 4 | Refills: 0 | Status: ON HOLD | COMMUNITY
Start: 2019-07-10

## 2021-11-05 RX ORDER — NADOLOL 40 MG/1
TAKE 1 TABLET BY MOUTH DAILY TABLET ORAL
Qty: 90 | Refills: 3 | Status: ON HOLD | COMMUNITY
Start: 2019-10-04

## 2021-11-05 RX ORDER — DUPILUMAB 300 MG/2ML
INJECTION, SOLUTION SUBCUTANEOUS
Qty: 4 | Refills: 0 | Status: ON HOLD | COMMUNITY
Start: 2018-11-14

## 2021-11-05 RX ORDER — TRIAMCINOLONE ACETONIDE 1 MG/G
CREAM TOPICAL
Qty: 454 | Refills: 0 | Status: ON HOLD | COMMUNITY
Start: 2019-03-14

## 2021-11-05 RX ORDER — URSODIOL 300 MG/1
1 TABLET CAPSULE ORAL
Qty: 270 TABLET | Refills: 3 | Status: ACTIVE | COMMUNITY

## 2021-11-05 RX ORDER — AZITHROMYCIN DIHYDRATE 250 MG/1
TABLET, FILM COATED ORAL
Qty: 6 | Refills: 0 | Status: ON HOLD | COMMUNITY
Start: 2012-02-22

## 2021-11-05 RX ORDER — IBUPROFEN 800 MG/1
TAKE TABLET  PRN TABLET ORAL
Refills: 0 | Status: ON HOLD | COMMUNITY
Start: 2018-02-25

## 2021-11-05 RX ORDER — MINOCYCLINE HYDROCHLORIDE 100 MG/1
CAPSULE ORAL
Qty: 44 | Refills: 0 | Status: ON HOLD | COMMUNITY
Start: 2018-03-26

## 2021-11-05 RX ORDER — METRONIDAZOLE 500 MG/1
TAKE 1 TABLET 3 TIMES DAILY TABLET ORAL
Qty: 30 | Refills: 0 | Status: ON HOLD | COMMUNITY
Start: 2020-01-03

## 2021-11-05 RX ORDER — CEFUROXIME AXETIL 250 MG/1
TABLET ORAL
Qty: 20 | Refills: 0 | Status: ON HOLD | COMMUNITY
Start: 2018-01-30

## 2021-11-05 RX ORDER — SPIRONOLACTONE 50 MG/1
TABLET, FILM COATED ORAL
Qty: 90 | Refills: 0 | Status: ON HOLD | COMMUNITY
Start: 2018-01-18

## 2021-11-05 RX ORDER — TRAZODONE HYDROCHLORIDE 50 MG/1
1 TABLET AT BEDTIME AS NEEDED TABLET, FILM COATED ORAL ONCE A DAY
Qty: 30 TABLET | Refills: 3 | Status: ACTIVE | COMMUNITY

## 2021-11-05 RX ORDER — CODEINE PHOSPHATE AND GUAIFENESIN 10; 100 MG/5ML; MG/5ML
LIQUID ORAL
Qty: 200 | Refills: 0 | Status: ON HOLD | COMMUNITY
Start: 2019-10-16

## 2021-11-05 RX ORDER — PREDNISONE 10 MG/1
TABLET ORAL
Qty: 21 | Refills: 0 | Status: ON HOLD | COMMUNITY
Start: 2018-01-30

## 2021-11-05 RX ORDER — OMEPRAZOLE 20 MG/1
TAKE 1 CAPSULE DAILY CAPSULE, DELAYED RELEASE ORAL
Qty: 90 | Refills: 2 | Status: ON HOLD | COMMUNITY
Start: 2019-10-04

## 2021-11-05 RX ORDER — TACROLIMUS 0.3 MG/G
APP TO HANDS AND FEET BID PRN OINTMENT TOPICAL
Qty: 30 | Refills: 0 | Status: ON HOLD | COMMUNITY
Start: 2018-09-19

## 2021-11-05 RX ORDER — BENZONATATE 100 MG/1
CAPSULE ORAL
Qty: 20 | Refills: 0 | Status: ON HOLD | COMMUNITY
Start: 2018-01-30

## 2021-11-05 RX ORDER — EVOLOCUMAB 140 MG/ML
BI WEEKLY INJECTION, SOLUTION SUBCUTANEOUS
Refills: 0 | Status: ON HOLD | COMMUNITY

## 2021-11-05 RX ORDER — AMITRIPTYLINE HYDROCHLORIDE 25 MG/1
1 TABLET AT BEDTIME TABLET, FILM COATED ORAL ONCE A DAY
Qty: 30 TABLET | Refills: 1 | Status: ACTIVE | COMMUNITY
Start: 2020-09-03

## 2021-11-05 RX ORDER — CIPROFLOXACIN HYDROCHLORIDE 500 MG/1
TAKE 1 TABLET TWICE DAILY TABLET, FILM COATED ORAL
Qty: 28 | Refills: 0 | Status: ON HOLD | COMMUNITY
Start: 2020-01-19

## 2021-11-05 RX ORDER — NITROFURANTOIN MONOHYDRATE/MACROCRYSTALLINE 25; 75 MG/1; MG/1
CAPSULE ORAL
Qty: 14 | Refills: 0 | Status: ON HOLD | COMMUNITY
Start: 2011-12-28

## 2021-11-05 RX ORDER — HYDROXYZINE HYDROCHLORIDE 50 MG/1
TAKE 1 TABLET EVERY 8 HOURS PRN FOR ITCHING TABLET, FILM COATED ORAL
Qty: 45 | Refills: 1 | Status: ON HOLD | COMMUNITY
Start: 2020-01-19

## 2021-11-05 RX ORDER — MOMETASONE FUROATE 1 MG/ML
SOLUTION TOPICAL
Qty: 60 | Refills: 0 | Status: ON HOLD | COMMUNITY
Start: 2012-02-17

## 2021-11-05 RX ORDER — CLOBETASOL PROPIONATE 0.5 MG/G
CREAM TOPICAL
Qty: 15 | Refills: 0 | Status: ON HOLD | COMMUNITY
Start: 2018-08-30

## 2021-11-05 RX ORDER — OXYCODONE HYDROCHLORIDE 5 MG/1
TK 1 T PO Q 12 H AS DIRECTED FOR 30 DAYS TABLET ORAL
Qty: 30 | Refills: 0 | Status: ON HOLD | COMMUNITY
Start: 2018-07-30

## 2021-11-05 RX ORDER — TERBINAFINE HYDROCHLORIDE 250 MG/1
TABLET ORAL
Qty: 30 | Refills: 0 | Status: ON HOLD | COMMUNITY
Start: 2019-08-09

## 2021-11-05 RX ORDER — EVOLOCUMAB 140 MG/ML
INJECTION, SOLUTION SUBCUTANEOUS
Qty: 6 | Refills: 0 | Status: ON HOLD | COMMUNITY
Start: 2018-04-03

## 2021-12-07 ENCOUNTER — LAB OUTSIDE AN ENCOUNTER (OUTPATIENT)
Dept: URBAN - METROPOLITAN AREA CLINIC 113 | Facility: CLINIC | Age: 56
End: 2021-12-07

## 2021-12-07 ENCOUNTER — WEB ENCOUNTER (OUTPATIENT)
Dept: URBAN - METROPOLITAN AREA CLINIC 113 | Facility: CLINIC | Age: 56
End: 2021-12-07

## 2021-12-07 ENCOUNTER — OFFICE VISIT (OUTPATIENT)
Dept: URBAN - METROPOLITAN AREA CLINIC 113 | Facility: CLINIC | Age: 56
End: 2021-12-07
Payer: COMMERCIAL

## 2021-12-07 VITALS
SYSTOLIC BLOOD PRESSURE: 105 MMHG | HEART RATE: 74 BPM | WEIGHT: 234 LBS | HEIGHT: 67 IN | BODY MASS INDEX: 36.73 KG/M2 | TEMPERATURE: 97.8 F | DIASTOLIC BLOOD PRESSURE: 65 MMHG

## 2021-12-07 DIAGNOSIS — I85.00 ESOPHAGEAL VARICES DETERMINED BY ENDOSCOPY: ICD-10-CM

## 2021-12-07 DIAGNOSIS — K83.01 PRIMARY SCLEROSING CHOLANGITIS: ICD-10-CM

## 2021-12-07 PROCEDURE — 99214 OFFICE O/P EST MOD 30 MIN: CPT | Performed by: INTERNAL MEDICINE

## 2021-12-07 RX ORDER — EVOLOCUMAB 140 MG/ML
INJECTION, SOLUTION SUBCUTANEOUS
Qty: 6 | Refills: 0 | Status: ON HOLD | COMMUNITY
Start: 2018-04-03

## 2021-12-07 RX ORDER — URSODIOL 300 MG/1
1 TABLET CAPSULE ORAL
Qty: 270 TABLET | Refills: 3 | Status: ACTIVE | COMMUNITY

## 2021-12-07 RX ORDER — TACROLIMUS 0.3 MG/G
APP TO HANDS AND FEET BID PRN OINTMENT TOPICAL
Qty: 30 | Refills: 0 | Status: ON HOLD | COMMUNITY
Start: 2018-09-19

## 2021-12-07 RX ORDER — PREDNISONE 10 MG/1
TABLET ORAL
Qty: 21 | Refills: 0 | Status: ON HOLD | COMMUNITY
Start: 2018-01-30

## 2021-12-07 RX ORDER — TERBINAFINE HYDROCHLORIDE 250 MG/1
TABLET ORAL
Qty: 30 | Refills: 0 | Status: ON HOLD | COMMUNITY
Start: 2019-08-09

## 2021-12-07 RX ORDER — PREDNISONE 5 MG/1
1 TABLET TABLET ORAL ONCE A DAY
Status: ACTIVE | COMMUNITY

## 2021-12-07 RX ORDER — DUPILUMAB 300 MG/2ML
INJECTION, SOLUTION SUBCUTANEOUS
Qty: 4 | Refills: 0 | Status: ON HOLD | COMMUNITY
Start: 2018-11-14

## 2021-12-07 RX ORDER — FAMOTIDINE 20 MG/1
1 TABLET AT BEDTIME AS NEEDED TABLET, FILM COATED ORAL ONCE A DAY
Status: ACTIVE | COMMUNITY

## 2021-12-07 RX ORDER — TRAZODONE HYDROCHLORIDE 50 MG/1
1 TABLET AT BEDTIME AS NEEDED TABLET, FILM COATED ORAL ONCE A DAY
Qty: 30 TABLET | Refills: 3 | Status: ACTIVE | COMMUNITY

## 2021-12-07 RX ORDER — EVOLOCUMAB 140 MG/ML
BI WEEKLY INJECTION, SOLUTION SUBCUTANEOUS
Refills: 0 | Status: ON HOLD | COMMUNITY

## 2021-12-07 RX ORDER — MYCOPHENOLATE MOFETIL 250 MG/1
2 CAPSULES CAPSULE ORAL ONCE A DAY
Status: ACTIVE | COMMUNITY

## 2021-12-07 RX ORDER — CODEINE PHOSPHATE AND GUAIFENESIN 10; 100 MG/5ML; MG/5ML
LIQUID ORAL
Qty: 200 | Refills: 0 | Status: ON HOLD | COMMUNITY
Start: 2019-10-16

## 2021-12-07 RX ORDER — TRIAMCINOLONE ACETONIDE 1 MG/G
CREAM TOPICAL
Qty: 454 | Refills: 0 | Status: ON HOLD | COMMUNITY
Start: 2019-03-14

## 2021-12-07 RX ORDER — SPIRONOLACTONE 50 MG/1
TABLET, FILM COATED ORAL
Qty: 90 | Refills: 0 | Status: ON HOLD | COMMUNITY
Start: 2018-01-18

## 2021-12-07 RX ORDER — CHOLECALCIFEROL (VITAMIN D3) 1250 MCG
TK 1 C PO 1 TIME Q WK CAPSULE ORAL
Qty: 4 | Refills: 0 | Status: ON HOLD | COMMUNITY
Start: 2019-07-10

## 2021-12-07 NOTE — EXAM-PHYSICAL EXAM
She is alert and oriented to person place and situation no acute distress.  She has mild scleral icterus.

## 2021-12-07 NOTE — HPI-TODAY'S VISIT:
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  Liver biopsy performed in 2010 revealed acute and chronic portal inflammation.  Hepatitis B and C were negative.  Antimitochondrial antibody was negative anti-smooth muscle antibody was negative.  Eventually she was diagnosed with sclerosing cholangitis.  Last ERCP was in April 2019 she is cofollowed at [facility].  She had masslike effect of the right hepatic duct on last ERCP but brushings were negative.  Last colonoscopy was in March 2019.  This revealed a 6 mm polyp at the hepatic flexure.  This returned as a hyperplastic polyp with no adenomatous change.  Last upper endoscopy was in October 2019.  This was to assess for varices.  Patient had grade 2 esophageal varices at that time.  In the interim patient has been diagnosed with breast cancer.  She has undergone surgery at [facility].  ERCP was repeated again September 2020.  Findings were largely unchanged from prior ERCP.  Brushings were negative.  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 She underwent left mastectomy last month at [facility].  Lymph nodes were negative. The patient states she is being considered for breast reconstruction surgery.  She otherwise is feeling quite well.  She is back on medications for her IgG4 disease.

## 2021-12-08 ENCOUNTER — TELEPHONE ENCOUNTER (OUTPATIENT)
Dept: URBAN - METROPOLITAN AREA CLINIC 113 | Facility: CLINIC | Age: 56
End: 2021-12-08

## 2021-12-08 LAB
A/G RATIO: 1
ALBUMIN: 3.4
ALKALINE PHOSPHATASE: 398
ALT (SGPT): 74
AST (SGOT): 112
BASO (ABSOLUTE): 0
BASOS: 1
BILIRUBIN, TOTAL: 4.4
BUN/CREATININE RATIO: 17
BUN: 14
CA 19-9: 100
CALCIUM: 8.7
CARBON DIOXIDE, TOTAL: 21
CHLORIDE: 106
CREATININE: 0.81
EGFR IF AFRICN AM: 95
EGFR IF NONAFRICN AM: 83
EOS (ABSOLUTE): 0.1
EOS: 1
GLOBULIN, TOTAL: 3.4
GLUCOSE: 137
HEMATOCRIT: 26.3
HEMATOLOGY COMMENTS:: (no result)
HEMOGLOBIN: 8.4
IMMATURE CELLS: (no result)
IMMATURE GRANS (ABS): 0
IMMATURE GRANULOCYTES: 1
INR: 1.1
LYMPHS (ABSOLUTE): 1.1
LYMPHS: 21
MCH: 25
MCHC: 31.9
MCV: 78
MONOCYTES(ABSOLUTE): 0.7
MONOCYTES: 13
NEUTROPHILS (ABSOLUTE): 3.4
NEUTROPHILS: 63
NRBC: (no result)
PLATELETS: 113
POTASSIUM: 3.8
PROTEIN, TOTAL: 6.8
PROTHROMBIN TIME: 11.2
RBC: 3.36
RDW: 15.8
SODIUM: 141
WBC: 5.3

## 2021-12-24 ENCOUNTER — OFFICE VISIT (OUTPATIENT)
Dept: URBAN - METROPOLITAN AREA CLINIC 113 | Facility: CLINIC | Age: 56
End: 2021-12-24

## 2022-01-04 ENCOUNTER — TELEPHONE ENCOUNTER (OUTPATIENT)
Dept: URBAN - METROPOLITAN AREA CLINIC 113 | Facility: CLINIC | Age: 57
End: 2022-01-04

## 2022-01-04 LAB
FERRITIN, SERUM: 28
IRON BIND.CAP.(TIBC): 446
IRON SATURATION: 7
IRON: 32
UIBC: 414

## 2022-01-04 RX ORDER — PREDNISONE 5 MG/1
1 TABLET TABLET ORAL ONCE A DAY
Status: ACTIVE | COMMUNITY

## 2022-01-04 RX ORDER — URSODIOL 300 MG/1
1 TABLET CAPSULE ORAL
Qty: 270 TABLET | Refills: 3 | Status: ACTIVE | COMMUNITY

## 2022-01-04 RX ORDER — TRAZODONE HYDROCHLORIDE 50 MG/1
1 TABLET AT BEDTIME AS NEEDED TABLET, FILM COATED ORAL ONCE A DAY
Qty: 30 TABLET | Refills: 0

## 2022-01-04 RX ORDER — TRIAMCINOLONE ACETONIDE 1 MG/G
CREAM TOPICAL
Qty: 454 | Refills: 0 | Status: ON HOLD | COMMUNITY
Start: 2019-03-14

## 2022-01-04 RX ORDER — FAMOTIDINE 20 MG/1
1 TABLET AT BEDTIME AS NEEDED TABLET, FILM COATED ORAL ONCE A DAY
Status: ACTIVE | COMMUNITY

## 2022-01-04 RX ORDER — SPIRONOLACTONE 50 MG/1
TABLET, FILM COATED ORAL
Qty: 90 | Refills: 0 | Status: ON HOLD | COMMUNITY
Start: 2018-01-18

## 2022-01-04 RX ORDER — EVOLOCUMAB 140 MG/ML
BI WEEKLY INJECTION, SOLUTION SUBCUTANEOUS
Refills: 0 | Status: ON HOLD | COMMUNITY

## 2022-01-04 RX ORDER — CHOLECALCIFEROL (VITAMIN D3) 1250 MCG
TK 1 C PO 1 TIME Q WK CAPSULE ORAL
Qty: 4 | Refills: 0 | Status: ON HOLD | COMMUNITY
Start: 2019-07-10

## 2022-01-04 RX ORDER — PREDNISONE 10 MG/1
TABLET ORAL
Qty: 21 | Refills: 0 | Status: ON HOLD | COMMUNITY
Start: 2018-01-30

## 2022-01-04 RX ORDER — TACROLIMUS 0.3 MG/G
APP TO HANDS AND FEET BID PRN OINTMENT TOPICAL
Qty: 30 | Refills: 0 | Status: ON HOLD | COMMUNITY
Start: 2018-09-19

## 2022-01-04 RX ORDER — CODEINE PHOSPHATE AND GUAIFENESIN 10; 100 MG/5ML; MG/5ML
LIQUID ORAL
Qty: 200 | Refills: 0 | Status: ON HOLD | COMMUNITY
Start: 2019-10-16

## 2022-01-04 RX ORDER — FERROUS SULFATE 325(65) MG
1 TABLET TABLET ORAL
Qty: 30 | Refills: 3 | OUTPATIENT

## 2022-01-04 RX ORDER — TERBINAFINE HYDROCHLORIDE 250 MG/1
TABLET ORAL
Qty: 30 | Refills: 0 | Status: ON HOLD | COMMUNITY
Start: 2019-08-09

## 2022-01-04 RX ORDER — TRAZODONE HYDROCHLORIDE 50 MG/1
1 TABLET AT BEDTIME AS NEEDED TABLET, FILM COATED ORAL ONCE A DAY
Qty: 30 TABLET | Refills: 3 | Status: ACTIVE | COMMUNITY

## 2022-01-04 RX ORDER — EVOLOCUMAB 140 MG/ML
INJECTION, SOLUTION SUBCUTANEOUS
Qty: 6 | Refills: 0 | Status: ON HOLD | COMMUNITY
Start: 2018-04-03

## 2022-01-04 RX ORDER — DUPILUMAB 300 MG/2ML
INJECTION, SOLUTION SUBCUTANEOUS
Qty: 4 | Refills: 0 | Status: ON HOLD | COMMUNITY
Start: 2018-11-14

## 2022-01-04 RX ORDER — MYCOPHENOLATE MOFETIL 250 MG/1
2 CAPSULES CAPSULE ORAL ONCE A DAY
Status: ACTIVE | COMMUNITY

## 2022-02-11 ENCOUNTER — OFFICE VISIT (OUTPATIENT)
Dept: URBAN - METROPOLITAN AREA MEDICAL CENTER 2 | Facility: MEDICAL CENTER | Age: 57
End: 2022-02-11

## 2022-03-01 ENCOUNTER — TELEPHONE ENCOUNTER (OUTPATIENT)
Dept: URBAN - METROPOLITAN AREA CLINIC 113 | Facility: CLINIC | Age: 57
End: 2022-03-01

## 2022-03-09 ENCOUNTER — OFFICE VISIT (OUTPATIENT)
Dept: URBAN - METROPOLITAN AREA MEDICAL CENTER 2 | Facility: MEDICAL CENTER | Age: 57
End: 2022-03-09
Payer: COMMERCIAL

## 2022-03-09 ENCOUNTER — TELEPHONE ENCOUNTER (OUTPATIENT)
Dept: URBAN - METROPOLITAN AREA CLINIC 113 | Facility: CLINIC | Age: 57
End: 2022-03-09

## 2022-03-09 DIAGNOSIS — I86.4 GASTRIC VARICES: ICD-10-CM

## 2022-03-09 DIAGNOSIS — I85.00 ESOPHAGEAL VARICES: ICD-10-CM

## 2022-03-09 DIAGNOSIS — K83.1 AMPULLARY STENOSIS: ICD-10-CM

## 2022-03-09 DIAGNOSIS — R97.8 ELEVATED CA 19-9 LEVEL: ICD-10-CM

## 2022-03-09 DIAGNOSIS — K83.01 PRIMARY SCLEROSING CHOLANGITIS: ICD-10-CM

## 2022-03-09 DIAGNOSIS — K83.8 ACQUIRED DILATION OF BILE DUCT: ICD-10-CM

## 2022-03-09 DIAGNOSIS — K83.1 AMPULLA OF VATER OBSTRUCTION SYNDROME: ICD-10-CM

## 2022-03-09 DIAGNOSIS — I85.00 ESOPHAGEAL VARICES WITHOUT BLEEDING: ICD-10-CM

## 2022-03-09 DIAGNOSIS — K83.09 SCLEROSING CHOLANGITIS: ICD-10-CM

## 2022-03-09 PROCEDURE — 43261 ENDO CHOLANGIOPANCREATOGRAPH: CPT | Performed by: INTERNAL MEDICINE

## 2022-03-09 PROCEDURE — 43273 ENDOSCOPIC PANCREATOSCOPY: CPT | Performed by: INTERNAL MEDICINE

## 2022-03-09 RX ORDER — DUPILUMAB 300 MG/2ML
INJECTION, SOLUTION SUBCUTANEOUS
Qty: 4 | Refills: 0 | Status: ON HOLD | COMMUNITY
Start: 2018-11-14

## 2022-03-09 RX ORDER — TERBINAFINE HYDROCHLORIDE 250 MG/1
TABLET ORAL
Qty: 30 | Refills: 0 | Status: ON HOLD | COMMUNITY
Start: 2019-08-09

## 2022-03-09 RX ORDER — PREDNISONE 5 MG/1
1 TABLET TABLET ORAL ONCE A DAY
Status: ACTIVE | COMMUNITY

## 2022-03-09 RX ORDER — EVOLOCUMAB 140 MG/ML
BI WEEKLY INJECTION, SOLUTION SUBCUTANEOUS
Refills: 0 | Status: ON HOLD | COMMUNITY

## 2022-03-09 RX ORDER — MYCOPHENOLATE MOFETIL 250 MG/1
2 CAPSULES CAPSULE ORAL ONCE A DAY
Status: ACTIVE | COMMUNITY

## 2022-03-09 RX ORDER — TRAZODONE HYDROCHLORIDE 50 MG/1
1 TABLET AT BEDTIME AS NEEDED TABLET, FILM COATED ORAL ONCE A DAY
Qty: 30 TABLET | Refills: 0 | Status: ACTIVE | COMMUNITY

## 2022-03-09 RX ORDER — FERROUS SULFATE 325(65) MG
1 TABLET TABLET ORAL
Qty: 30 | Refills: 3 | Status: ACTIVE | COMMUNITY

## 2022-03-09 RX ORDER — PREDNISONE 10 MG/1
TABLET ORAL
Qty: 21 | Refills: 0 | Status: ON HOLD | COMMUNITY
Start: 2018-01-30

## 2022-03-09 RX ORDER — FAMOTIDINE 20 MG/1
1 TABLET AT BEDTIME AS NEEDED TABLET, FILM COATED ORAL ONCE A DAY
Status: ACTIVE | COMMUNITY

## 2022-03-09 RX ORDER — TACROLIMUS 0.3 MG/G
APP TO HANDS AND FEET BID PRN OINTMENT TOPICAL
Qty: 30 | Refills: 0 | Status: ON HOLD | COMMUNITY
Start: 2018-09-19

## 2022-03-09 RX ORDER — CHOLECALCIFEROL (VITAMIN D3) 1250 MCG
TK 1 C PO 1 TIME Q WK CAPSULE ORAL
Qty: 4 | Refills: 0 | Status: ON HOLD | COMMUNITY
Start: 2019-07-10

## 2022-03-09 RX ORDER — EVOLOCUMAB 140 MG/ML
INJECTION, SOLUTION SUBCUTANEOUS
Qty: 6 | Refills: 0 | Status: ON HOLD | COMMUNITY
Start: 2018-04-03

## 2022-03-09 RX ORDER — CODEINE PHOSPHATE AND GUAIFENESIN 10; 100 MG/5ML; MG/5ML
LIQUID ORAL
Qty: 200 | Refills: 0 | Status: ON HOLD | COMMUNITY
Start: 2019-10-16

## 2022-03-09 RX ORDER — URSODIOL 300 MG/1
1 TABLET CAPSULE ORAL
Qty: 270 TABLET | Refills: 3 | Status: ACTIVE | COMMUNITY

## 2022-03-09 RX ORDER — TRIAMCINOLONE ACETONIDE 1 MG/G
CREAM TOPICAL
Qty: 454 | Refills: 0 | Status: ON HOLD | COMMUNITY
Start: 2019-03-14

## 2022-03-09 RX ORDER — SPIRONOLACTONE 50 MG/1
TABLET, FILM COATED ORAL
Qty: 90 | Refills: 0 | Status: ON HOLD | COMMUNITY
Start: 2018-01-18

## 2022-03-23 ENCOUNTER — TELEPHONE ENCOUNTER (OUTPATIENT)
Dept: URBAN - METROPOLITAN AREA CLINIC 113 | Facility: CLINIC | Age: 57
End: 2022-03-23

## 2022-03-25 ENCOUNTER — OFFICE VISIT (OUTPATIENT)
Dept: URBAN - METROPOLITAN AREA CLINIC 113 | Facility: CLINIC | Age: 57
End: 2022-03-25
Payer: COMMERCIAL

## 2022-03-25 VITALS
HEIGHT: 67 IN | TEMPERATURE: 97.5 F | HEART RATE: 81 BPM | DIASTOLIC BLOOD PRESSURE: 67 MMHG | WEIGHT: 237 LBS | BODY MASS INDEX: 37.2 KG/M2 | SYSTOLIC BLOOD PRESSURE: 103 MMHG

## 2022-03-25 DIAGNOSIS — D50.9 MICROCYTIC ANEMIA: ICD-10-CM

## 2022-03-25 DIAGNOSIS — I86.4 GASTRIC VARICES: ICD-10-CM

## 2022-03-25 DIAGNOSIS — C50.919 MALIGNANT NEOPLASM OF FEMALE BREAST, UNSPECIFIED ESTROGEN RECEPTOR STATUS, UNSPECIFIED LATERALITY, UNSPECIFIED SITE OF BREAST: ICD-10-CM

## 2022-03-25 DIAGNOSIS — E53.8 B12 DEFICIENCY: ICD-10-CM

## 2022-03-25 DIAGNOSIS — N13.30 HYDRONEPHROSIS, LEFT: ICD-10-CM

## 2022-03-25 DIAGNOSIS — K83.01 PRIMARY SCLEROSING CHOLANGITIS: ICD-10-CM

## 2022-03-25 DIAGNOSIS — I85.00 ESOPHAGEAL VARICES DETERMINED BY ENDOSCOPY: ICD-10-CM

## 2022-03-25 DIAGNOSIS — D89.89 IGG4 RELATED DISEASE: ICD-10-CM

## 2022-03-25 PROBLEM — 234349007: Status: ACTIVE | Noted: 2021-12-08

## 2022-03-25 PROCEDURE — 99214 OFFICE O/P EST MOD 30 MIN: CPT | Performed by: INTERNAL MEDICINE

## 2022-03-25 RX ORDER — EVOLOCUMAB 140 MG/ML
BI WEEKLY INJECTION, SOLUTION SUBCUTANEOUS
Refills: 0 | Status: ON HOLD | COMMUNITY

## 2022-03-25 RX ORDER — FERROUS SULFATE 325(65) MG
1 TABLET TABLET ORAL
Qty: 30 | Refills: 3 | Status: ACTIVE | COMMUNITY

## 2022-03-25 RX ORDER — CHOLECALCIFEROL (VITAMIN D3) 1250 MCG
TK 1 C PO 1 TIME Q WK CAPSULE ORAL
Qty: 4 | Refills: 0 | Status: ON HOLD | COMMUNITY
Start: 2019-07-10

## 2022-03-25 RX ORDER — FAMOTIDINE 20 MG/1
1 TABLET AT BEDTIME AS NEEDED TABLET, FILM COATED ORAL ONCE A DAY
Status: ACTIVE | COMMUNITY

## 2022-03-25 RX ORDER — SPIRONOLACTONE 50 MG/1
TABLET, FILM COATED ORAL
Qty: 90 | Refills: 0 | Status: ON HOLD | COMMUNITY
Start: 2018-01-18

## 2022-03-25 RX ORDER — TRAZODONE HYDROCHLORIDE 50 MG/1
1 TABLET AT BEDTIME AS NEEDED TABLET, FILM COATED ORAL ONCE A DAY
Qty: 30 TABLET | Refills: 0 | Status: ACTIVE | COMMUNITY

## 2022-03-25 RX ORDER — PREDNISONE 5 MG/1
1 TABLET TABLET ORAL ONCE A DAY
Status: ACTIVE | COMMUNITY

## 2022-03-25 RX ORDER — URSODIOL 300 MG/1
1 TABLET CAPSULE ORAL
Qty: 270 TABLET | Refills: 3 | Status: ACTIVE | COMMUNITY

## 2022-03-25 RX ORDER — CODEINE PHOSPHATE AND GUAIFENESIN 10; 100 MG/5ML; MG/5ML
LIQUID ORAL
Qty: 200 | Refills: 0 | Status: ON HOLD | COMMUNITY
Start: 2019-10-16

## 2022-03-25 RX ORDER — PREDNISONE 10 MG/1
TABLET ORAL
Qty: 21 | Refills: 0 | Status: ON HOLD | COMMUNITY
Start: 2018-01-30

## 2022-03-25 RX ORDER — TERBINAFINE HYDROCHLORIDE 250 MG/1
TABLET ORAL
Qty: 30 | Refills: 0 | Status: ON HOLD | COMMUNITY
Start: 2019-08-09

## 2022-03-25 RX ORDER — EVOLOCUMAB 140 MG/ML
INJECTION, SOLUTION SUBCUTANEOUS
Qty: 6 | Refills: 0 | Status: ON HOLD | COMMUNITY
Start: 2018-04-03

## 2022-03-25 RX ORDER — DUPILUMAB 300 MG/2ML
INJECTION, SOLUTION SUBCUTANEOUS
Qty: 4 | Refills: 0 | Status: ON HOLD | COMMUNITY
Start: 2018-11-14

## 2022-03-25 RX ORDER — MYCOPHENOLATE MOFETIL 250 MG/1
2 CAPSULES CAPSULE ORAL ONCE A DAY
Status: ACTIVE | COMMUNITY

## 2022-03-25 RX ORDER — TACROLIMUS 0.3 MG/G
APP TO HANDS AND FEET BID PRN OINTMENT TOPICAL
Qty: 30 | Refills: 0 | Status: ON HOLD | COMMUNITY
Start: 2018-09-19

## 2022-03-25 RX ORDER — TRIAMCINOLONE ACETONIDE 1 MG/G
CREAM TOPICAL
Qty: 454 | Refills: 0 | Status: ON HOLD | COMMUNITY
Start: 2019-03-14

## 2022-03-25 NOTE — HPI-TODAY'S VISIT:
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  Liver biopsy performed in 2010 revealed acute and chronic portal inflammation.  Hepatitis B and C were negative.  Antimitochondrial antibody was negative anti-smooth muscle antibody was negative.  Eventually she was diagnosed with sclerosing cholangitis.  Last ERCP was in April 2019 she is cofollowed at [facility].  She had masslike effect of the right hepatic duct on last ERCP but brushings were negative.  Last colonoscopy was in March 2019.  This revealed a 6 mm polyp at the hepatic flexure.  This returned as a hyperplastic polyp with no adenomatous change.  Last upper endoscopy was in October 2019.  This was to assess for varices.  Patient had grade 2 esophageal varices at that time.  In the interim patient has been diagnosed with breast cancer.  She has undergone surgery at [facility].  ERCP was repeated again September 2020.  Findings were largely unchanged from prior ERCP.  Brushings were negative.  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 She underwent left mastectomy last month at [facility].  Lymph nodes were negative. The patient states she is being considered for breast reconstruction surgery.  She otherwise is feeling quite well.  She is back on medications for her IgG4 disease. Recent ERCP showed nearly complete obstruction of both the right and left hepatic duct with some pus coming out of the left hepatic duct system.  Extensive spyglass biopsies along with brushings were performed.  These were all negative for malignancy.  Laboratory testing in February revealed a total bilirubin of 4.9 alkaline phosphatase of 480  ALT 71.  BUN and creatinine normal.  INR 1.1.  Hemoglobin 9.2 with a low MCV of 71.8 and platelets of 107. ERCP also revealed small gastric varices and esophageal varices. Interval history.  The patient has no new complaints today.  She has numerous questions in regards to what criteria are necessary for her to be listed for transplant.  She asked about coming off of ursodeoxycholic acid which I told her would be okay as it is not felt to be significantly beneficial and sclerosing cholangitis in her diseases so advanced at this point it is unlikely to be providing benefit.

## 2022-03-25 NOTE — EXAM-PHYSICAL EXAM
She is alert and oriented to person place and situation no acute distress.  She is icteric.  No evidence for ascites.

## 2022-04-20 ENCOUNTER — TELEPHONE ENCOUNTER (OUTPATIENT)
Dept: URBAN - METROPOLITAN AREA CLINIC 113 | Facility: CLINIC | Age: 57
End: 2022-04-20

## 2022-04-20 RX ORDER — TRAZODONE HYDROCHLORIDE 50 MG/1
1 TABLET AT BEDTIME AS NEEDED TABLET, FILM COATED ORAL ONCE A DAY
Qty: 30 TABLET | Refills: 0

## 2022-04-27 ENCOUNTER — TELEPHONE ENCOUNTER (OUTPATIENT)
Dept: URBAN - METROPOLITAN AREA CLINIC 113 | Facility: CLINIC | Age: 57
End: 2022-04-27

## 2022-04-28 ENCOUNTER — TELEPHONE ENCOUNTER (OUTPATIENT)
Dept: URBAN - METROPOLITAN AREA CLINIC 113 | Facility: CLINIC | Age: 57
End: 2022-04-28

## 2022-05-04 ENCOUNTER — TELEPHONE ENCOUNTER (OUTPATIENT)
Dept: URBAN - METROPOLITAN AREA CLINIC 113 | Facility: CLINIC | Age: 57
End: 2022-05-04

## 2022-05-06 ENCOUNTER — TELEPHONE ENCOUNTER (OUTPATIENT)
Dept: URBAN - METROPOLITAN AREA CLINIC 113 | Facility: CLINIC | Age: 57
End: 2022-05-06

## 2022-05-06 ENCOUNTER — LAB OUTSIDE AN ENCOUNTER (OUTPATIENT)
Dept: URBAN - METROPOLITAN AREA CLINIC 113 | Facility: CLINIC | Age: 57
End: 2022-05-06

## 2022-05-06 LAB
A/G RATIO: 0.9
ALBUMIN: 3.2
ALKALINE PHOSPHATASE: 544
ALT (SGPT): 79
AST (SGOT): 153
BASO (ABSOLUTE): 0
BASOS: 0
BILIRUBIN, TOTAL: 8.7
BUN/CREATININE RATIO: 15
BUN: 13
CALCIUM: 8.4
CARBON DIOXIDE, TOTAL: 20
CHLORIDE: 103
CREATININE: 0.86
EGFR: 80
EOS (ABSOLUTE): 0.1
EOS: 1
GLOBULIN, TOTAL: 3.6
GLUCOSE: 151
HEMATOCRIT: 32.4
HEMATOLOGY COMMENTS:: (no result)
HEMOGLOBIN: 11.1
IMMATURE CELLS: (no result)
IMMATURE GRANS (ABS): 0
IMMATURE GRANULOCYTES: 1
LYMPHS (ABSOLUTE): 1
LYMPHS: 17
MCH: 25.8
MCHC: 34.3
MCV: 75
MONOCYTES(ABSOLUTE): 0.7
MONOCYTES: 12
NEUTROPHILS (ABSOLUTE): 3.9
NEUTROPHILS: 69
NRBC: (no result)
PLATELETS: 111
POTASSIUM: 3.9
PROTEIN, TOTAL: 6.8
RBC: 4.3
RDW: 24.2
SODIUM: 139
WBC: 5.7

## 2022-05-06 RX ORDER — MYCOPHENOLATE MOFETIL 250 MG/1
2 CAPSULES CAPSULE ORAL ONCE A DAY
Status: ACTIVE | COMMUNITY

## 2022-05-06 RX ORDER — METRONIDAZOLE 500 MG/1
1 TABLET TABLET ORAL THREE TIMES A DAY
Qty: 42 TABLET | Refills: 0 | OUTPATIENT
Start: 2022-05-06 | End: 2022-05-20

## 2022-05-06 RX ORDER — EVOLOCUMAB 140 MG/ML
INJECTION, SOLUTION SUBCUTANEOUS
Qty: 6 | Refills: 0 | Status: ON HOLD | COMMUNITY
Start: 2018-04-03

## 2022-05-06 RX ORDER — TRAZODONE HYDROCHLORIDE 50 MG/1
1 TABLET AT BEDTIME AS NEEDED TABLET, FILM COATED ORAL ONCE A DAY
Qty: 30 TABLET | Refills: 0 | Status: ACTIVE | COMMUNITY

## 2022-05-06 RX ORDER — TERBINAFINE HYDROCHLORIDE 250 MG/1
TABLET ORAL
Qty: 30 | Refills: 0 | Status: ON HOLD | COMMUNITY
Start: 2019-08-09

## 2022-05-06 RX ORDER — CODEINE PHOSPHATE AND GUAIFENESIN 10; 100 MG/5ML; MG/5ML
LIQUID ORAL
Qty: 200 | Refills: 0 | Status: ON HOLD | COMMUNITY
Start: 2019-10-16

## 2022-05-06 RX ORDER — TACROLIMUS 0.3 MG/G
APP TO HANDS AND FEET BID PRN OINTMENT TOPICAL
Qty: 30 | Refills: 0 | Status: ON HOLD | COMMUNITY
Start: 2018-09-19

## 2022-05-06 RX ORDER — FERROUS SULFATE 325(65) MG
1 TABLET TABLET ORAL
Qty: 30 | Refills: 3 | Status: ACTIVE | COMMUNITY

## 2022-05-06 RX ORDER — DUPILUMAB 300 MG/2ML
INJECTION, SOLUTION SUBCUTANEOUS
Qty: 4 | Refills: 0 | Status: ON HOLD | COMMUNITY
Start: 2018-11-14

## 2022-05-06 RX ORDER — PREDNISONE 10 MG/1
TABLET ORAL
Qty: 21 | Refills: 0 | Status: ON HOLD | COMMUNITY
Start: 2018-01-30

## 2022-05-06 RX ORDER — CIPROFLOXACIN HYDROCHLORIDE 500 MG/1
1 TABLET TABLET, FILM COATED ORAL TWICE DAILY
Qty: 28 | Refills: 0 | OUTPATIENT
Start: 2022-05-06 | End: 2022-05-20

## 2022-05-06 RX ORDER — FAMOTIDINE 20 MG/1
1 TABLET AT BEDTIME AS NEEDED TABLET, FILM COATED ORAL ONCE A DAY
Status: ACTIVE | COMMUNITY

## 2022-05-06 RX ORDER — CHOLECALCIFEROL (VITAMIN D3) 1250 MCG
TK 1 C PO 1 TIME Q WK CAPSULE ORAL
Qty: 4 | Refills: 0 | Status: ON HOLD | COMMUNITY
Start: 2019-07-10

## 2022-05-06 RX ORDER — PREDNISONE 5 MG/1
1 TABLET TABLET ORAL ONCE A DAY
Status: ACTIVE | COMMUNITY

## 2022-05-06 RX ORDER — TRIAMCINOLONE ACETONIDE 1 MG/G
CREAM TOPICAL
Qty: 454 | Refills: 0 | Status: ON HOLD | COMMUNITY
Start: 2019-03-14

## 2022-05-06 RX ORDER — SPIRONOLACTONE 50 MG/1
TABLET, FILM COATED ORAL
Qty: 90 | Refills: 0 | Status: ON HOLD | COMMUNITY
Start: 2018-01-18

## 2022-05-06 RX ORDER — EVOLOCUMAB 140 MG/ML
BI WEEKLY INJECTION, SOLUTION SUBCUTANEOUS
Refills: 0 | Status: ON HOLD | COMMUNITY

## 2022-05-06 RX ORDER — URSODIOL 300 MG/1
1 TABLET CAPSULE ORAL
Qty: 270 TABLET | Refills: 3 | Status: ACTIVE | COMMUNITY

## 2022-05-11 ENCOUNTER — ERX REFILL RESPONSE (OUTPATIENT)
Dept: URBAN - METROPOLITAN AREA CLINIC 113 | Facility: CLINIC | Age: 57
End: 2022-05-11

## 2022-05-11 RX ORDER — URSODIOL 300 MG/1
TAKE 1 CAPSULE BY MOUTH THREE TIMES DAILY CAPSULE ORAL
Qty: 270 CAPSULE | Refills: 12 | OUTPATIENT

## 2022-05-11 RX ORDER — URSODIOL 300 MG/1
1 TABLET CAPSULE ORAL
Qty: 270 TABLET | Refills: 3 | OUTPATIENT

## 2022-06-07 ENCOUNTER — TELEPHONE ENCOUNTER (OUTPATIENT)
Dept: URBAN - METROPOLITAN AREA CLINIC 113 | Facility: CLINIC | Age: 57
End: 2022-06-07

## 2022-06-07 RX ORDER — SERTRALINE HYDROCHLORIDE 50 MG/1
1 TABLET TABLET, FILM COATED ORAL ONCE A DAY
Qty: 30 | Refills: 3 | OUTPATIENT

## 2022-06-13 ENCOUNTER — TELEPHONE ENCOUNTER (OUTPATIENT)
Dept: URBAN - METROPOLITAN AREA CLINIC 113 | Facility: CLINIC | Age: 57
End: 2022-06-13

## 2022-06-13 RX ORDER — PHYTONADIONE (VIT K1) 100 MCG
AS DIRECTED TABLET ORAL ONCE A DAY
Qty: 90 | Refills: 2 | OUTPATIENT

## 2022-06-16 ENCOUNTER — TELEPHONE ENCOUNTER (OUTPATIENT)
Dept: URBAN - METROPOLITAN AREA CLINIC 113 | Facility: CLINIC | Age: 57
End: 2022-06-16

## 2022-06-20 ENCOUNTER — OFFICE VISIT (OUTPATIENT)
Dept: URBAN - METROPOLITAN AREA CLINIC 113 | Facility: CLINIC | Age: 57
End: 2022-06-20
Payer: COMMERCIAL

## 2022-06-20 VITALS
TEMPERATURE: 98 F | BODY MASS INDEX: 36.41 KG/M2 | WEIGHT: 232 LBS | HEIGHT: 67 IN | DIASTOLIC BLOOD PRESSURE: 65 MMHG | SYSTOLIC BLOOD PRESSURE: 114 MMHG | HEART RATE: 83 BPM

## 2022-06-20 DIAGNOSIS — C50.919 MALIGNANT NEOPLASM OF FEMALE BREAST, UNSPECIFIED ESTROGEN RECEPTOR STATUS, UNSPECIFIED LATERALITY, UNSPECIFIED SITE OF BREAST: ICD-10-CM

## 2022-06-20 DIAGNOSIS — D89.89 IGG4 RELATED DISEASE: ICD-10-CM

## 2022-06-20 DIAGNOSIS — K83.01 PRIMARY SCLEROSING CHOLANGITIS: ICD-10-CM

## 2022-06-20 DIAGNOSIS — I85.00 ESOPHAGEAL VARICES DETERMINED BY ENDOSCOPY: ICD-10-CM

## 2022-06-20 DIAGNOSIS — E53.8 B12 DEFICIENCY: ICD-10-CM

## 2022-06-20 DIAGNOSIS — N13.30 HYDRONEPHROSIS, LEFT: ICD-10-CM

## 2022-06-20 DIAGNOSIS — I86.4 GASTRIC VARICES: ICD-10-CM

## 2022-06-20 PROCEDURE — 99214 OFFICE O/P EST MOD 30 MIN: CPT | Performed by: INTERNAL MEDICINE

## 2022-06-20 RX ORDER — FAMOTIDINE 20 MG/1
1 TABLET AT BEDTIME AS NEEDED TABLET, FILM COATED ORAL ONCE A DAY
Status: ACTIVE | COMMUNITY

## 2022-06-20 RX ORDER — PREDNISONE 10 MG/1
TABLET ORAL
Qty: 21 | Refills: 0 | Status: ON HOLD | COMMUNITY
Start: 2018-01-30

## 2022-06-20 RX ORDER — EVOLOCUMAB 140 MG/ML
INJECTION, SOLUTION SUBCUTANEOUS
Qty: 6 | Refills: 0 | Status: ON HOLD | COMMUNITY
Start: 2018-04-03

## 2022-06-20 RX ORDER — URSODIOL 300 MG/1
TAKE 1 CAPSULE BY MOUTH THREE TIMES DAILY CAPSULE ORAL
Qty: 270 CAPSULE | Refills: 12 | Status: ACTIVE | COMMUNITY

## 2022-06-20 RX ORDER — CODEINE PHOSPHATE AND GUAIFENESIN 10; 100 MG/5ML; MG/5ML
LIQUID ORAL
Qty: 200 | Refills: 0 | Status: ON HOLD | COMMUNITY
Start: 2019-10-16

## 2022-06-20 RX ORDER — SERTRALINE HYDROCHLORIDE 50 MG/1
1 TABLET TABLET, FILM COATED ORAL ONCE A DAY
Qty: 30 | Refills: 3 | Status: ACTIVE | COMMUNITY

## 2022-06-20 RX ORDER — SPIRONOLACTONE 50 MG/1
TABLET, FILM COATED ORAL
Qty: 90 | Refills: 0 | Status: ON HOLD | COMMUNITY
Start: 2018-01-18

## 2022-06-20 RX ORDER — MYCOPHENOLATE MOFETIL 250 MG/1
2 CAPSULES CAPSULE ORAL ONCE A DAY
Status: ACTIVE | COMMUNITY

## 2022-06-20 RX ORDER — FERROUS SULFATE 325(65) MG
1 TABLET TABLET ORAL
Qty: 30 | Refills: 3 | Status: ACTIVE | COMMUNITY

## 2022-06-20 RX ORDER — TERBINAFINE HYDROCHLORIDE 250 MG/1
TABLET ORAL
Qty: 30 | Refills: 0 | Status: ON HOLD | COMMUNITY
Start: 2019-08-09

## 2022-06-20 RX ORDER — EVOLOCUMAB 140 MG/ML
BI WEEKLY INJECTION, SOLUTION SUBCUTANEOUS
Refills: 0 | Status: ON HOLD | COMMUNITY

## 2022-06-20 RX ORDER — DUPILUMAB 300 MG/2ML
INJECTION, SOLUTION SUBCUTANEOUS
Qty: 4 | Refills: 0 | Status: ON HOLD | COMMUNITY
Start: 2018-11-14

## 2022-06-20 RX ORDER — TACROLIMUS 0.3 MG/G
APP TO HANDS AND FEET BID PRN OINTMENT TOPICAL
Qty: 30 | Refills: 0 | Status: ON HOLD | COMMUNITY
Start: 2018-09-19

## 2022-06-20 RX ORDER — CHOLECALCIFEROL (VITAMIN D3) 1250 MCG
TK 1 C PO 1 TIME Q WK CAPSULE ORAL
Qty: 4 | Refills: 0 | Status: ON HOLD | COMMUNITY
Start: 2019-07-10

## 2022-06-20 RX ORDER — TRIAMCINOLONE ACETONIDE 1 MG/G
CREAM TOPICAL
Qty: 454 | Refills: 0 | Status: ON HOLD | COMMUNITY
Start: 2019-03-14

## 2022-06-20 RX ORDER — PREDNISONE 5 MG/1
1 TABLET TABLET ORAL ONCE A DAY
Status: ACTIVE | COMMUNITY

## 2022-06-20 RX ORDER — TRAZODONE HYDROCHLORIDE 50 MG/1
1 TABLET AT BEDTIME AS NEEDED TABLET, FILM COATED ORAL ONCE A DAY
Qty: 30 TABLET | Refills: 0 | Status: ACTIVE | COMMUNITY

## 2022-06-20 RX ORDER — PHYTONADIONE (VIT K1) 100 MCG
AS DIRECTED TABLET ORAL ONCE A DAY
Qty: 90 | Refills: 2 | Status: ACTIVE | COMMUNITY

## 2022-06-20 NOTE — EXAM-PHYSICAL EXAM
She is alert and oriented to person place and situation no acute distress.  She does have scleral icterus positive.  Patient has no evidence of asterixis.  She is often tearful in regards to her daughter's recent passing.

## 2022-06-20 NOTE — HPI-TODAY'S VISIT:
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  Liver biopsy performed in 2010 revealed acute and chronic portal inflammation.  Hepatitis B and C were negative.  Antimitochondrial antibody was negative anti-smooth muscle antibody was negative.  Eventually she was diagnosed with sclerosing cholangitis.  Last ERCP was in April 2019 she is cofollowed at [facility].  She had masslike effect of the right hepatic duct on last ERCP but brushings were negative.  Last colonoscopy was in March 2019.  This revealed a 6 mm polyp at the hepatic flexure.  This returned as a hyperplastic polyp with no adenomatous change.  Last upper endoscopy was in October 2019.  This was to assess for varices.  Patient had grade 2 esophageal varices at that time.  In the interim patient has been diagnosed with breast cancer.  She has undergone surgery at [facility].  ERCP was repeated again September 2020.  Findings were largely unchanged from prior ERCP.  Brushings were negative.  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 She underwent left mastectomy last month at [facility].  Lymph nodes were negative. The patient states she is being considered for breast reconstruction surgery.  She otherwise is feeling quite well.  She is back on medications for her IgG4 disease. Recent ERCP showed nearly complete obstruction of both the right and left hepatic duct with some pus coming out of the left hepatic duct system.  Extensive spyglass biopsies along with brushings were performed.  These were all negative for malignancy.  Laboratory testing in February revealed a total bilirubin of 4.9 alkaline phosphatase of 480  ALT 71.  BUN and creatinine normal.  INR 1.1.  Hemoglobin 9.2 with a low MCV of 71.8 and platelets of 107. ERCP also revealed small gastric varices and esophageal varices. Interval history.  The patient has no new complaints today.  She has numerous questions in regards to what criteria are necessary for her to be listed for transplant.  She asked about coming off of ursodeoxycholic acid which I told her would be okay as it is not felt to be significantly beneficial and sclerosing cholangitis in her diseases so advanced at this point it is unlikely to be providing benefit. Interval history.  The patient was admitted to [facility] last week when she presented for urinary stent change and was found to have a total bilirubin of 13 along with an INR of 4.  Communication from [facility] transplant department stated they have now listed for liver transplant.  They are requesting every 2 week laboratory testing.  I have reviewed extensive records from [facility].  Of also had long discussions with [place] transplant physicians as they are also evaluating her for transplant. Further laboratory testing on admission at [facility] on [facility] 10 revealed INR 4.5 total bilirubin 13.8  ALT 94 alkaline phosphatase 526.  Liver transplant team felt that rifampin was the source of the increasing total bilirubin and they discontinued this.  Her meld score was calculated at 34. The patient states now that she has not been listed at [place] but they are reconsidering her for transplant.  She did see [facility] last week in regards to transplant.  She states laboratory testing there showed a bilirubin to be 15.  She states that [facility] would like a repeat ERCP.  I am going to discuss her case with both [facility] and [place] in regards to the next step.  She

## 2022-06-21 ENCOUNTER — TELEPHONE ENCOUNTER (OUTPATIENT)
Dept: URBAN - METROPOLITAN AREA CLINIC 113 | Facility: CLINIC | Age: 57
End: 2022-06-21

## 2022-06-21 LAB
A/G RATIO: 1
ALBUMIN: 3.3
ALKALINE PHOSPHATASE: 587
ALT (SGPT): 110
AST (SGOT): 240
BASO (ABSOLUTE): 0.1
BASOS: 1
BILIRUBIN, TOTAL: 13.2
BUN/CREATININE RATIO: 18
BUN: 14
CALCIUM: 8.3
CARBON DIOXIDE, TOTAL: 16
CHLORIDE: 103
CREATININE: 0.79
EGFR: 89
EOS (ABSOLUTE): 0
EOS: 1
GLOBULIN, TOTAL: 3.2
GLUCOSE: 162
HEMATOCRIT: 30.1
HEMATOLOGY COMMENTS:: (no result)
HEMOGLOBIN: 10.6
IMMATURE CELLS: (no result)
IMMATURE GRANS (ABS): 0.1
IMMATURE GRANULOCYTES: 1
INR: 1.1
LYMPHS (ABSOLUTE): 1
LYMPHS: 16
MCH: 28.6
MCHC: 35.2
MCV: 81
MONOCYTES(ABSOLUTE): 0.6
MONOCYTES: 9
NEUTROPHILS (ABSOLUTE): 4.7
NEUTROPHILS: 72
NRBC: (no result)
PLATELETS: 135
POTASSIUM: 3.9
PROTEIN, TOTAL: 6.5
PROTHROMBIN TIME: 11.9
RBC: 3.71
RDW: 21.4
SODIUM: 136
WBC: 6.5

## 2022-07-02 ENCOUNTER — TELEPHONE ENCOUNTER (OUTPATIENT)
Dept: URBAN - METROPOLITAN AREA CLINIC 113 | Facility: CLINIC | Age: 57
End: 2022-07-02

## 2022-07-05 ENCOUNTER — TELEPHONE ENCOUNTER (OUTPATIENT)
Dept: URBAN - METROPOLITAN AREA CLINIC 113 | Facility: CLINIC | Age: 57
End: 2022-07-05

## 2022-07-06 ENCOUNTER — LAB OUTSIDE AN ENCOUNTER (OUTPATIENT)
Dept: URBAN - METROPOLITAN AREA CLINIC 113 | Facility: CLINIC | Age: 57
End: 2022-07-06

## 2022-07-06 ENCOUNTER — TELEPHONE ENCOUNTER (OUTPATIENT)
Dept: URBAN - METROPOLITAN AREA CLINIC 113 | Facility: CLINIC | Age: 57
End: 2022-07-06

## 2022-07-06 LAB
A/G RATIO: 0.9
ALBUMIN: 3.4
ALKALINE PHOSPHATASE: 616
ALT (SGPT): 87
AST (SGOT): 195
BASO (ABSOLUTE): 0
BASOS: 1
BILIRUBIN, TOTAL: 15.2
BUN/CREATININE RATIO: 17
BUN: 13
CALCIUM: 8.5
CARBON DIOXIDE, TOTAL: 15
CERULOPLASMIN: 51.3
CHLORIDE: 98
CREATININE: 0.77
EGFR: 92
EOS (ABSOLUTE): 0
EOS: 1
GLOBULIN, TOTAL: 3.7
GLUCOSE: 111
HEMATOCRIT: 32.8
HEMATOLOGY COMMENTS:: (no result)
HEMOGLOBIN: 11.2
IMMATURE CELLS: (no result)
IMMATURE GRANS (ABS): 0.1
IMMATURE GRANULOCYTES: 1
INR: 2.1
LYMPHS (ABSOLUTE): 1
LYMPHS: 17
MCH: 26.7
MCHC: 34.1
MCV: 78
MONOCYTES(ABSOLUTE): 0.8
MONOCYTES: 12
NEUTROPHILS (ABSOLUTE): 4.4
NEUTROPHILS: 68
NRBC: (no result)
PLATELETS: 113
POTASSIUM: 4.2
PROTEIN, TOTAL: 7.1
PROTHROMBIN TIME: 21
RBC: 4.19
RDW: 21.1
RPR: NON REACTIVE
SODIUM: 131
WBC: 6.3

## 2022-07-08 ENCOUNTER — TELEPHONE ENCOUNTER (OUTPATIENT)
Dept: URBAN - METROPOLITAN AREA CLINIC 113 | Facility: CLINIC | Age: 57
End: 2022-07-08

## 2022-07-08 ENCOUNTER — LAB OUTSIDE AN ENCOUNTER (OUTPATIENT)
Dept: URBAN - METROPOLITAN AREA CLINIC 113 | Facility: CLINIC | Age: 57
End: 2022-07-08

## 2022-07-09 ENCOUNTER — TELEPHONE ENCOUNTER (OUTPATIENT)
Dept: URBAN - METROPOLITAN AREA CLINIC 113 | Facility: CLINIC | Age: 57
End: 2022-07-09

## 2022-07-09 RX ORDER — ONDANSETRON HYDROCHLORIDE 4 MG/1
1 TABLET TABLET, FILM COATED ORAL
Qty: 30 | Refills: 1 | OUTPATIENT
Start: 2022-07-09

## 2022-07-14 ENCOUNTER — TELEPHONE ENCOUNTER (OUTPATIENT)
Dept: URBAN - METROPOLITAN AREA CLINIC 113 | Facility: CLINIC | Age: 57
End: 2022-07-14

## 2022-07-14 ENCOUNTER — OFFICE VISIT (OUTPATIENT)
Dept: URBAN - METROPOLITAN AREA MEDICAL CENTER 19 | Facility: MEDICAL CENTER | Age: 57
End: 2022-07-14
Payer: COMMERCIAL

## 2022-07-14 DIAGNOSIS — K86.89 ACUTE PANCREATIC FLUID COLLECTION: ICD-10-CM

## 2022-07-14 DIAGNOSIS — R59.0 ABDOMINAL LYMPHADENOPATHY: ICD-10-CM

## 2022-07-14 DIAGNOSIS — K83.1 BILIARY STRICTURE: ICD-10-CM

## 2022-07-14 DIAGNOSIS — K83.01 PRIMARY SCLEROSING CHOLANGITIS: ICD-10-CM

## 2022-07-14 DIAGNOSIS — R17 CHOLESTATIC JAUNDICE: ICD-10-CM

## 2022-07-14 DIAGNOSIS — K86.2 CYST OF PANCREAS: ICD-10-CM

## 2022-07-14 DIAGNOSIS — R93.2 ABN FIND-BILIARY TRACT: ICD-10-CM

## 2022-07-14 DIAGNOSIS — R93.5 ABDOMINAL ULTRASOUND, ABNORMAL: ICD-10-CM

## 2022-07-14 PROBLEM — 197441003: Status: ACTIVE | Noted: 2022-07-14

## 2022-07-14 PROCEDURE — 74328 X-RAY BILE DUCT ENDOSCOPY: CPT | Performed by: INTERNAL MEDICINE

## 2022-07-14 PROCEDURE — 43242 EGD US FINE NEEDLE BX/ASPIR: CPT | Performed by: INTERNAL MEDICINE

## 2022-07-14 PROCEDURE — 43277 ERCP EA DUCT/AMPULLA DILATE: CPT | Performed by: INTERNAL MEDICINE

## 2022-07-14 RX ORDER — PHYTONADIONE (VIT K1) 100 MCG
AS DIRECTED TABLET ORAL ONCE A DAY
Qty: 90 | Refills: 2 | Status: ACTIVE | COMMUNITY

## 2022-07-14 RX ORDER — TACROLIMUS 0.3 MG/G
APP TO HANDS AND FEET BID PRN OINTMENT TOPICAL
Qty: 30 | Refills: 0 | Status: ON HOLD | COMMUNITY
Start: 2018-09-19

## 2022-07-14 RX ORDER — ONDANSETRON HYDROCHLORIDE 4 MG/1
1 TABLET TABLET, FILM COATED ORAL
Qty: 30 | Refills: 1 | Status: ACTIVE | COMMUNITY
Start: 2022-07-09

## 2022-07-14 RX ORDER — DUPILUMAB 300 MG/2ML
INJECTION, SOLUTION SUBCUTANEOUS
Qty: 4 | Refills: 0 | Status: ON HOLD | COMMUNITY
Start: 2018-11-14

## 2022-07-14 RX ORDER — CODEINE PHOSPHATE AND GUAIFENESIN 10; 100 MG/5ML; MG/5ML
LIQUID ORAL
Qty: 200 | Refills: 0 | Status: ON HOLD | COMMUNITY
Start: 2019-10-16

## 2022-07-14 RX ORDER — MYCOPHENOLATE MOFETIL 250 MG/1
2 CAPSULES CAPSULE ORAL ONCE A DAY
Status: ACTIVE | COMMUNITY

## 2022-07-14 RX ORDER — TRIAMCINOLONE ACETONIDE 1 MG/G
CREAM TOPICAL
Qty: 454 | Refills: 0 | Status: ON HOLD | COMMUNITY
Start: 2019-03-14

## 2022-07-14 RX ORDER — FERROUS SULFATE 325(65) MG
1 TABLET TABLET ORAL
Qty: 30 | Refills: 3 | Status: ACTIVE | COMMUNITY

## 2022-07-14 RX ORDER — EVOLOCUMAB 140 MG/ML
BI WEEKLY INJECTION, SOLUTION SUBCUTANEOUS
Refills: 0 | Status: ON HOLD | COMMUNITY

## 2022-07-14 RX ORDER — SERTRALINE HYDROCHLORIDE 50 MG/1
1 TABLET TABLET, FILM COATED ORAL ONCE A DAY
Qty: 30 | Refills: 3 | Status: ACTIVE | COMMUNITY

## 2022-07-14 RX ORDER — FAMOTIDINE 20 MG/1
1 TABLET AT BEDTIME AS NEEDED TABLET, FILM COATED ORAL ONCE A DAY
Status: ACTIVE | COMMUNITY

## 2022-07-14 RX ORDER — EVOLOCUMAB 140 MG/ML
INJECTION, SOLUTION SUBCUTANEOUS
Qty: 6 | Refills: 0 | Status: ON HOLD | COMMUNITY
Start: 2018-04-03

## 2022-07-14 RX ORDER — CIPROFLOXACIN HYDROCHLORIDE 500 MG/1
1 TABLET TABLET, FILM COATED ORAL
Qty: 14 TABLET | Refills: 0 | OUTPATIENT
Start: 2022-07-14 | End: 2022-07-21

## 2022-07-14 RX ORDER — SPIRONOLACTONE 50 MG/1
TABLET, FILM COATED ORAL
Qty: 90 | Refills: 0 | Status: ON HOLD | COMMUNITY
Start: 2018-01-18

## 2022-07-14 RX ORDER — URSODIOL 300 MG/1
TAKE 1 CAPSULE BY MOUTH THREE TIMES DAILY CAPSULE ORAL
Qty: 270 CAPSULE | Refills: 12 | Status: ACTIVE | COMMUNITY

## 2022-07-14 RX ORDER — TERBINAFINE HYDROCHLORIDE 250 MG/1
TABLET ORAL
Qty: 30 | Refills: 0 | Status: ON HOLD | COMMUNITY
Start: 2019-08-09

## 2022-07-14 RX ORDER — CHOLECALCIFEROL (VITAMIN D3) 1250 MCG
TK 1 C PO 1 TIME Q WK CAPSULE ORAL
Qty: 4 | Refills: 0 | Status: ON HOLD | COMMUNITY
Start: 2019-07-10

## 2022-07-14 RX ORDER — TRAZODONE HYDROCHLORIDE 50 MG/1
1 TABLET AT BEDTIME AS NEEDED TABLET, FILM COATED ORAL ONCE A DAY
Qty: 30 TABLET | Refills: 0 | Status: ACTIVE | COMMUNITY

## 2022-07-14 RX ORDER — PREDNISONE 5 MG/1
1 TABLET TABLET ORAL ONCE A DAY
Status: ACTIVE | COMMUNITY

## 2022-07-14 RX ORDER — PREDNISONE 10 MG/1
TABLET ORAL
Qty: 21 | Refills: 0 | Status: ON HOLD | COMMUNITY
Start: 2018-01-30

## 2022-07-19 ENCOUNTER — LAB OUTSIDE AN ENCOUNTER (OUTPATIENT)
Dept: URBAN - METROPOLITAN AREA CLINIC 121 | Facility: CLINIC | Age: 57
End: 2022-07-19

## 2022-07-21 ENCOUNTER — TELEPHONE ENCOUNTER (OUTPATIENT)
Dept: URBAN - METROPOLITAN AREA CLINIC 113 | Facility: CLINIC | Age: 57
End: 2022-07-21

## 2022-07-21 PROBLEM — 235921003 BILIARY STRICTURE: Status: ACTIVE | Noted: 2022-07-21

## 2022-07-22 LAB
A/G RATIO: 1
ABSOLUTE BASOPHILS: 22
ABSOLUTE EOSINOPHILS: 51
ABSOLUTE LYMPHOCYTES: 964
ABSOLUTE MONOCYTES: 810
ABSOLUTE NEUTROPHILS: 5453
ALBUMIN: 3.2
ALKALINE PHOSPHATASE: 528
ALT (SGPT): 94
AST (SGOT): 178
BASOPHILS: 0.3
BILIRUBIN, TOTAL: 16.7
BUN/CREATININE RATIO: (no result)
BUN: 12
CALCIUM: 8.5
CARBON DIOXIDE, TOTAL: 25
CBC MORPHOLOGY: (no result)
CHLORIDE: 104
CREATININE: 0.83
EGFR: 84
EOSINOPHILS: 0.7
GLOBULIN, TOTAL: 3.3
GLUCOSE: 164
HEMATOCRIT: 33.3
HEMOGLOBIN: 11.3
INR: 1.6
LYMPHOCYTES: 13.2
MCH: 27
MCHC: 33.9
MCV: 79.5
MONOCYTES: 11.1
MPV: (no result)
NEUTROPHILS: 74.7
PLATELET COUNT: 101
POTASSIUM: 4.5
PROTEIN, TOTAL: 6.5
PT: 16.3
RDW: 22
RED BLOOD CELL COUNT: 4.19
SODIUM: 135
WHITE BLOOD CELL COUNT: 7.3

## 2022-08-10 ENCOUNTER — LAB OUTSIDE AN ENCOUNTER (OUTPATIENT)
Dept: URBAN - METROPOLITAN AREA CLINIC 113 | Facility: CLINIC | Age: 57
End: 2022-08-10

## 2022-08-11 ENCOUNTER — LAB OUTSIDE AN ENCOUNTER (OUTPATIENT)
Dept: URBAN - METROPOLITAN AREA CLINIC 113 | Facility: CLINIC | Age: 57
End: 2022-08-11

## 2022-08-11 ENCOUNTER — TELEPHONE ENCOUNTER (OUTPATIENT)
Dept: URBAN - METROPOLITAN AREA CLINIC 113 | Facility: CLINIC | Age: 57
End: 2022-08-11

## 2022-08-11 LAB
A/G RATIO: 0.9
ABSOLUTE BASOPHILS: 31
ABSOLUTE EOSINOPHILS: 79
ABSOLUTE LYMPHOCYTES: 1495
ABSOLUTE MONOCYTES: 872
ABSOLUTE NEUTROPHILS: 3623
ALBUMIN: 3.2
ALKALINE PHOSPHATASE: 274
ALT (SGPT): 55
AST (SGOT): 130
BASOPHILS: 0.5
BILIRUBIN, TOTAL: 9.8
BUN/CREATININE RATIO: (no result)
BUN: 11
CALCIUM: 8.6
CARBON DIOXIDE, TOTAL: 19
CHLORIDE: 107
CREATININE: 0.73
EGFR: 97
EOSINOPHILS: 1.3
GLOBULIN, TOTAL: 3.7
GLUCOSE: 116
HEMATOCRIT: 30.7
HEMOGLOBIN: 10.1
INR: 1.6
LYMPHOCYTES: 24.5
MCH: 27.1
MCHC: 32.9
MCV: 82.3
MONOCYTES: 14.3
MPV: (no result)
NEUTROPHILS: 59.4
PLATELET COUNT: 100
POTASSIUM: 4.6
PROTEIN, TOTAL: 6.9
PT: 16.4
RDW: 20.5
RED BLOOD CELL COUNT: 3.73
SODIUM: 137
WHITE BLOOD CELL COUNT: 6.1

## 2022-11-22 ENCOUNTER — TELEPHONE ENCOUNTER (OUTPATIENT)
Dept: URBAN - METROPOLITAN AREA CLINIC 113 | Facility: CLINIC | Age: 57
End: 2022-11-22

## 2022-11-22 RX ORDER — FAMOTIDINE 20 MG/1
1 TABLET AT BEDTIME AS NEEDED TABLET, FILM COATED ORAL TWICE A DAY
Qty: 180 TABLET | Refills: 3

## 2022-12-02 ENCOUNTER — CLAIMS CREATED FROM THE CLAIM WINDOW (OUTPATIENT)
Dept: URBAN - METROPOLITAN AREA MEDICAL CENTER 43 | Facility: MEDICAL CENTER | Age: 57
End: 2022-12-02
Payer: COMMERCIAL

## 2022-12-02 DIAGNOSIS — K83.01 PRIMARY SCLEROSING CHOLANGITIS: ICD-10-CM

## 2022-12-02 DIAGNOSIS — R79.89 ABNORMAL BILIRUBIN TEST: ICD-10-CM

## 2022-12-02 DIAGNOSIS — K81.0 ACUTE CHOLECYSTITIS: ICD-10-CM

## 2022-12-02 DIAGNOSIS — R74.8 ABNORMAL LEVELS OF OTHER SERUM ENZYMES: ICD-10-CM

## 2022-12-02 PROCEDURE — 99222 1ST HOSP IP/OBS MODERATE 55: CPT | Performed by: INTERNAL MEDICINE

## 2022-12-02 PROCEDURE — 99254 IP/OBS CNSLTJ NEW/EST MOD 60: CPT | Performed by: INTERNAL MEDICINE

## 2022-12-03 ENCOUNTER — CLAIMS CREATED FROM THE CLAIM WINDOW (OUTPATIENT)
Dept: URBAN - METROPOLITAN AREA MEDICAL CENTER 43 | Facility: MEDICAL CENTER | Age: 57
End: 2022-12-03
Payer: COMMERCIAL

## 2022-12-03 DIAGNOSIS — A41.89 OTHER SPECIFIED SEPSIS: ICD-10-CM

## 2022-12-03 DIAGNOSIS — K81.0 ACUTE CHOLECYSTITIS: ICD-10-CM

## 2022-12-03 DIAGNOSIS — K83.01 PRIMARY SCLEROSING CHOLANGITIS: ICD-10-CM

## 2022-12-03 DIAGNOSIS — K74.69 CIRRHOSIS, CRYPTOGENIC: ICD-10-CM

## 2022-12-03 PROCEDURE — 99233 SBSQ HOSP IP/OBS HIGH 50: CPT | Performed by: INTERNAL MEDICINE

## 2022-12-04 ENCOUNTER — CLAIMS CREATED FROM THE CLAIM WINDOW (OUTPATIENT)
Dept: URBAN - METROPOLITAN AREA MEDICAL CENTER 43 | Facility: MEDICAL CENTER | Age: 57
End: 2022-12-04
Payer: COMMERCIAL

## 2022-12-04 DIAGNOSIS — K83.01 PRIMARY SCLEROSING CHOLANGITIS: ICD-10-CM

## 2022-12-04 DIAGNOSIS — K81.0 ACUTE CHOLECYSTITIS: ICD-10-CM

## 2022-12-04 DIAGNOSIS — A41.89 OTHER SPECIFIED SEPSIS: ICD-10-CM

## 2022-12-04 DIAGNOSIS — K74.69 CIRRHOSIS, CRYPTOGENIC: ICD-10-CM

## 2022-12-04 PROCEDURE — 99233 SBSQ HOSP IP/OBS HIGH 50: CPT | Performed by: INTERNAL MEDICINE

## 2022-12-05 ENCOUNTER — CLAIMS CREATED FROM THE CLAIM WINDOW (OUTPATIENT)
Dept: URBAN - METROPOLITAN AREA MEDICAL CENTER 43 | Facility: MEDICAL CENTER | Age: 57
End: 2022-12-05
Payer: COMMERCIAL

## 2022-12-05 DIAGNOSIS — A41.89 OTHER SPECIFIED SEPSIS: ICD-10-CM

## 2022-12-05 DIAGNOSIS — K83.01 PRIMARY SCLEROSING CHOLANGITIS: ICD-10-CM

## 2022-12-05 DIAGNOSIS — K74.69 CIRRHOSIS, CRYPTOGENIC: ICD-10-CM

## 2022-12-05 DIAGNOSIS — K81.0 ACALCULOUS CHOLECYSTITIS: ICD-10-CM

## 2022-12-05 PROCEDURE — 99233 SBSQ HOSP IP/OBS HIGH 50: CPT | Performed by: INTERNAL MEDICINE

## 2022-12-06 ENCOUNTER — CLAIMS CREATED FROM THE CLAIM WINDOW (OUTPATIENT)
Dept: URBAN - METROPOLITAN AREA MEDICAL CENTER 43 | Facility: MEDICAL CENTER | Age: 57
End: 2022-12-06
Payer: COMMERCIAL

## 2022-12-06 DIAGNOSIS — K74.69 CIRRHOSIS, CRYPTOGENIC: ICD-10-CM

## 2022-12-06 DIAGNOSIS — A41.89 OTHER SPECIFIED SEPSIS: ICD-10-CM

## 2022-12-06 DIAGNOSIS — K81.0 ACALCULOUS CHOLECYSTITIS: ICD-10-CM

## 2022-12-06 DIAGNOSIS — K83.01 PRIMARY SCLEROSING CHOLANGITIS: ICD-10-CM

## 2022-12-06 PROCEDURE — 99233 SBSQ HOSP IP/OBS HIGH 50: CPT | Performed by: INTERNAL MEDICINE

## 2022-12-07 ENCOUNTER — CLAIMS CREATED FROM THE CLAIM WINDOW (OUTPATIENT)
Dept: URBAN - METROPOLITAN AREA MEDICAL CENTER 43 | Facility: MEDICAL CENTER | Age: 57
End: 2022-12-07
Payer: COMMERCIAL

## 2022-12-07 DIAGNOSIS — A41.89 OTHER SPECIFIED SEPSIS: ICD-10-CM

## 2022-12-07 DIAGNOSIS — K83.01 PRIMARY SCLEROSING CHOLANGITIS: ICD-10-CM

## 2022-12-07 DIAGNOSIS — K74.69 CIRRHOSIS, CRYPTOGENIC: ICD-10-CM

## 2022-12-07 DIAGNOSIS — K81.0 ACALCULOUS CHOLECYSTITIS: ICD-10-CM

## 2022-12-07 PROCEDURE — 99233 SBSQ HOSP IP/OBS HIGH 50: CPT | Performed by: INTERNAL MEDICINE

## 2022-12-08 ENCOUNTER — CLAIMS CREATED FROM THE CLAIM WINDOW (OUTPATIENT)
Dept: URBAN - METROPOLITAN AREA MEDICAL CENTER 43 | Facility: MEDICAL CENTER | Age: 57
End: 2022-12-08
Payer: COMMERCIAL

## 2022-12-08 DIAGNOSIS — K83.01 PRIMARY SCLEROSING CHOLANGITIS: ICD-10-CM

## 2022-12-08 DIAGNOSIS — K74.69 CIRRHOSIS, CRYPTOGENIC: ICD-10-CM

## 2022-12-08 DIAGNOSIS — A41.89 OTHER SPECIFIED SEPSIS: ICD-10-CM

## 2022-12-08 DIAGNOSIS — K81.0 ACALCULOUS CHOLECYSTITIS: ICD-10-CM

## 2022-12-08 PROCEDURE — 99233 SBSQ HOSP IP/OBS HIGH 50: CPT | Performed by: INTERNAL MEDICINE

## 2022-12-09 ENCOUNTER — CLAIMS CREATED FROM THE CLAIM WINDOW (OUTPATIENT)
Dept: URBAN - METROPOLITAN AREA MEDICAL CENTER 43 | Facility: MEDICAL CENTER | Age: 57
End: 2022-12-09
Payer: COMMERCIAL

## 2022-12-09 DIAGNOSIS — A41.89 OTHER SPECIFIED SEPSIS: ICD-10-CM

## 2022-12-09 DIAGNOSIS — K81.0 ACALCULOUS CHOLECYSTITIS: ICD-10-CM

## 2022-12-09 DIAGNOSIS — K83.01 PRIMARY SCLEROSING CHOLANGITIS: ICD-10-CM

## 2022-12-09 DIAGNOSIS — K74.69 CIRRHOSIS, CRYPTOGENIC: ICD-10-CM

## 2022-12-09 PROCEDURE — 99233 SBSQ HOSP IP/OBS HIGH 50: CPT | Performed by: INTERNAL MEDICINE

## 2022-12-10 ENCOUNTER — CLAIMS CREATED FROM THE CLAIM WINDOW (OUTPATIENT)
Dept: URBAN - METROPOLITAN AREA MEDICAL CENTER 43 | Facility: MEDICAL CENTER | Age: 57
End: 2022-12-10
Payer: COMMERCIAL

## 2022-12-10 DIAGNOSIS — K83.01 PRIMARY SCLEROSING CHOLANGITIS: ICD-10-CM

## 2022-12-10 DIAGNOSIS — A41.89 OTHER SPECIFIED SEPSIS: ICD-10-CM

## 2022-12-10 DIAGNOSIS — K81.0 ACALCULOUS CHOLECYSTITIS: ICD-10-CM

## 2022-12-10 PROCEDURE — 99233 SBSQ HOSP IP/OBS HIGH 50: CPT | Performed by: INTERNAL MEDICINE

## 2022-12-11 ENCOUNTER — CLAIMS CREATED FROM THE CLAIM WINDOW (OUTPATIENT)
Dept: URBAN - METROPOLITAN AREA MEDICAL CENTER 43 | Facility: MEDICAL CENTER | Age: 57
End: 2022-12-11
Payer: COMMERCIAL

## 2022-12-11 DIAGNOSIS — K81.0 ACALCULOUS CHOLECYSTITIS: ICD-10-CM

## 2022-12-11 DIAGNOSIS — K83.01 PRIMARY SCLEROSING CHOLANGITIS: ICD-10-CM

## 2022-12-11 DIAGNOSIS — A41.89 OTHER SPECIFIED SEPSIS: ICD-10-CM

## 2022-12-11 PROCEDURE — 99233 SBSQ HOSP IP/OBS HIGH 50: CPT | Performed by: INTERNAL MEDICINE

## 2022-12-15 ENCOUNTER — TELEPHONE ENCOUNTER (OUTPATIENT)
Dept: URBAN - METROPOLITAN AREA CLINIC 113 | Facility: CLINIC | Age: 57
End: 2022-12-15

## 2022-12-17 PROBLEM — 266468003 CIRRHOSIS - NON-ALCOHOLIC: Status: ACTIVE | Noted: 2022-12-17

## 2022-12-22 ENCOUNTER — OFFICE VISIT (OUTPATIENT)
Dept: URBAN - METROPOLITAN AREA CLINIC 113 | Facility: CLINIC | Age: 57
End: 2022-12-22

## 2022-12-30 ENCOUNTER — TELEPHONE ENCOUNTER (OUTPATIENT)
Dept: URBAN - METROPOLITAN AREA CLINIC 113 | Facility: CLINIC | Age: 57
End: 2022-12-30

## 2023-01-03 ENCOUNTER — TELEPHONE ENCOUNTER (OUTPATIENT)
Dept: URBAN - METROPOLITAN AREA CLINIC 113 | Facility: CLINIC | Age: 58
End: 2023-01-03

## 2023-01-09 ENCOUNTER — LAB OUTSIDE AN ENCOUNTER (OUTPATIENT)
Dept: URBAN - METROPOLITAN AREA CLINIC 113 | Facility: CLINIC | Age: 58
End: 2023-01-09

## 2023-01-09 ENCOUNTER — TELEPHONE ENCOUNTER (OUTPATIENT)
Dept: URBAN - METROPOLITAN AREA CLINIC 113 | Facility: CLINIC | Age: 58
End: 2023-01-09

## 2023-01-23 ENCOUNTER — LAB OUTSIDE AN ENCOUNTER (OUTPATIENT)
Dept: URBAN - METROPOLITAN AREA CLINIC 113 | Facility: CLINIC | Age: 58
End: 2023-01-23

## 2023-01-24 ENCOUNTER — LAB OUTSIDE AN ENCOUNTER (OUTPATIENT)
Dept: URBAN - METROPOLITAN AREA CLINIC 113 | Facility: CLINIC | Age: 58
End: 2023-01-24

## 2023-01-24 ENCOUNTER — TELEPHONE ENCOUNTER (OUTPATIENT)
Dept: URBAN - METROPOLITAN AREA CLINIC 113 | Facility: CLINIC | Age: 58
End: 2023-01-24

## 2023-01-24 LAB
A/G RATIO: 0.6
ABSOLUTE BASOPHILS: 21
ABSOLUTE EOSINOPHILS: 71
ABSOLUTE LYMPHOCYTES: 888
ABSOLUTE MONOCYTES: 788
ABSOLUTE NEUTROPHILS: 5332
ALBUMIN: 2.7
ALKALINE PHOSPHATASE: 304
ALT (SGPT): 56
AST (SGOT): 100
BASOPHILS: 0.3
BILIRUBIN, TOTAL: 9.9
BUN/CREATININE RATIO: (no result)
BUN: 13
CALCIUM: 7.9
CARBON DIOXIDE, TOTAL: 21
CHLORIDE: 103
CREATININE: 0.66
EGFR: 104
EOSINOPHILS: 1
GLOBULIN, TOTAL: 4.9
GLUCOSE: 144
HEMATOCRIT: 29.4
HEMOGLOBIN: 9.9
LYMPHOCYTES: 12.5
MCH: 26.8
MCHC: 33.7
MCV: 79.5
MONOCYTES: 11.1
MPV: (no result)
NEUTROPHILS: 75.1
PLATELET COUNT: 128
POTASSIUM: 3.6
PROTEIN, TOTAL: 7.6
RDW: 17.6
RED BLOOD CELL COUNT: 3.7
SODIUM: 133
WHITE BLOOD CELL COUNT: 7.1

## 2023-01-27 ENCOUNTER — TELEPHONE ENCOUNTER (OUTPATIENT)
Dept: URBAN - METROPOLITAN AREA CLINIC 113 | Facility: CLINIC | Age: 58
End: 2023-01-27

## 2023-01-31 ENCOUNTER — OFFICE VISIT (OUTPATIENT)
Dept: URBAN - METROPOLITAN AREA CLINIC 113 | Facility: CLINIC | Age: 58
End: 2023-01-31

## 2023-01-31 LAB
A/G RATIO: 0.6
ABSOLUTE BASOPHILS: 28
ABSOLUTE EOSINOPHILS: 43
ABSOLUTE LYMPHOCYTES: 1186
ABSOLUTE MONOCYTES: 746
ABSOLUTE NEUTROPHILS: 5098
ALBUMIN: 2.9
ALKALINE PHOSPHATASE: 311
ALT (SGPT): 45
AST (SGOT): 107
BASOPHILS: 0.4
BILIRUBIN, TOTAL: 8.5
BUN/CREATININE RATIO: (no result)
BUN: 9
CALCIUM: 8.3
CARBON DIOXIDE, TOTAL: 17
CHLORIDE: 105
CREATININE: 0.61
EGFR: 106
EOSINOPHILS: 0.6
GLOBULIN, TOTAL: 5.1
GLUCOSE: 109
HEMATOCRIT: 30.9
HEMOGLOBIN: 10.5
LYMPHOCYTES: 16.7
MCH: 26.9
MCHC: 34
MCV: 79
MONOCYTES: 10.5
MPV: (no result)
NEUTROPHILS: 71.8
PLATELET COUNT: 125
POTASSIUM: 3.6
PROTEIN, TOTAL: 8
RDW: 18.2
RED BLOOD CELL COUNT: 3.91
SODIUM: 135
WHITE BLOOD CELL COUNT: 7.1

## 2023-02-17 ENCOUNTER — LAB OUTSIDE AN ENCOUNTER (OUTPATIENT)
Dept: URBAN - METROPOLITAN AREA CLINIC 113 | Facility: CLINIC | Age: 58
End: 2023-02-17

## 2023-02-17 ENCOUNTER — TELEPHONE ENCOUNTER (OUTPATIENT)
Dept: URBAN - METROPOLITAN AREA CLINIC 113 | Facility: CLINIC | Age: 58
End: 2023-02-17

## 2023-02-17 ENCOUNTER — WEB ENCOUNTER (OUTPATIENT)
Dept: URBAN - METROPOLITAN AREA CLINIC 113 | Facility: CLINIC | Age: 58
End: 2023-02-17

## 2023-02-17 ENCOUNTER — OFFICE VISIT (OUTPATIENT)
Dept: URBAN - METROPOLITAN AREA CLINIC 113 | Facility: CLINIC | Age: 58
End: 2023-02-17
Payer: COMMERCIAL

## 2023-02-17 VITALS
BODY MASS INDEX: 34.84 KG/M2 | WEIGHT: 222 LBS | HEIGHT: 67 IN | SYSTOLIC BLOOD PRESSURE: 112 MMHG | RESPIRATION RATE: 16 BRPM | TEMPERATURE: 98.6 F | HEART RATE: 83 BPM | DIASTOLIC BLOOD PRESSURE: 68 MMHG

## 2023-02-17 DIAGNOSIS — K75.0 LIVER ABSCESS: ICD-10-CM

## 2023-02-17 DIAGNOSIS — C50.919 MALIGNANT NEOPLASM OF FEMALE BREAST, UNSPECIFIED ESTROGEN RECEPTOR STATUS, UNSPECIFIED LATERALITY, UNSPECIFIED SITE OF BREAST: ICD-10-CM

## 2023-02-17 DIAGNOSIS — I85.00 ESOPHAGEAL VARICES DETERMINED BY ENDOSCOPY: ICD-10-CM

## 2023-02-17 DIAGNOSIS — I86.4 GASTRIC VARICES: ICD-10-CM

## 2023-02-17 DIAGNOSIS — D89.89 IGG4 RELATED DISEASE: ICD-10-CM

## 2023-02-17 DIAGNOSIS — K83.01 PRIMARY SCLEROSING CHOLANGITIS: ICD-10-CM

## 2023-02-17 DIAGNOSIS — Z12.11 COLON CANCER SCREENING: ICD-10-CM

## 2023-02-17 PROBLEM — 372064008: Status: ACTIVE | Noted: 2021-09-17

## 2023-02-17 PROBLEM — 28670008: Status: ACTIVE | Noted: 2021-12-07

## 2023-02-17 PROBLEM — 10743271000119103: Status: ACTIVE | Noted: 2022-03-25

## 2023-02-17 PROBLEM — 197441003: Status: ACTIVE | Noted: 2020-08-17

## 2023-02-17 PROCEDURE — 99214 OFFICE O/P EST MOD 30 MIN: CPT | Performed by: INTERNAL MEDICINE

## 2023-02-17 RX ORDER — SERTRALINE HYDROCHLORIDE 50 MG/1
1 TABLET TABLET, FILM COATED ORAL ONCE A DAY
Qty: 30 | Refills: 3 | Status: ON HOLD | COMMUNITY

## 2023-02-17 RX ORDER — SPIRONOLACTONE 50 MG/1
TABLET, FILM COATED ORAL
Qty: 90 | Refills: 0 | Status: ON HOLD | COMMUNITY
Start: 2018-01-18

## 2023-02-17 RX ORDER — TRIAMCINOLONE ACETONIDE 1 MG/G
CREAM TOPICAL
Qty: 454 | Refills: 0 | Status: ON HOLD | COMMUNITY
Start: 2019-03-14

## 2023-02-17 RX ORDER — ONDANSETRON HYDROCHLORIDE 4 MG/1
1 TABLET TABLET, FILM COATED ORAL
Qty: 30 | Refills: 1 | Status: ACTIVE | COMMUNITY
Start: 2022-07-09

## 2023-02-17 RX ORDER — PREDNISONE 10 MG/1
TABLET ORAL
Qty: 21 | Refills: 0 | Status: ON HOLD | COMMUNITY
Start: 2018-01-30

## 2023-02-17 RX ORDER — MYCOPHENOLATE MOFETIL 250 MG/1
2 CAPSULES CAPSULE ORAL ONCE A DAY
Status: ACTIVE | COMMUNITY

## 2023-02-17 RX ORDER — PREDNISONE 5 MG/1
1 TABLET TABLET ORAL ONCE A DAY
Status: ACTIVE | COMMUNITY

## 2023-02-17 RX ORDER — TACROLIMUS 0.3 MG/G
APP TO HANDS AND FEET BID PRN OINTMENT TOPICAL
Qty: 30 | Refills: 0 | Status: ON HOLD | COMMUNITY
Start: 2018-09-19

## 2023-02-17 RX ORDER — EVOLOCUMAB 140 MG/ML
BI WEEKLY INJECTION, SOLUTION SUBCUTANEOUS
Refills: 0 | Status: ON HOLD | COMMUNITY

## 2023-02-17 RX ORDER — PHYTONADIONE (VIT K1) 100 MCG
AS DIRECTED TABLET ORAL ONCE A DAY
Qty: 90 | Refills: 2 | Status: ON HOLD | COMMUNITY

## 2023-02-17 RX ORDER — FERROUS SULFATE 325(65) MG
1 TABLET TABLET ORAL
Qty: 30 | Refills: 3 | Status: ACTIVE | COMMUNITY

## 2023-02-17 RX ORDER — TERBINAFINE HYDROCHLORIDE 250 MG/1
TABLET ORAL
Qty: 30 | Refills: 0 | Status: ON HOLD | COMMUNITY
Start: 2019-08-09

## 2023-02-17 RX ORDER — URSODIOL 300 MG/1
TAKE 1 CAPSULE BY MOUTH THREE TIMES DAILY CAPSULE ORAL
Qty: 270 CAPSULE | Refills: 12 | Status: ACTIVE | COMMUNITY

## 2023-02-17 RX ORDER — FAMOTIDINE 20 MG/1
1 TABLET AT BEDTIME AS NEEDED TABLET, FILM COATED ORAL TWICE A DAY
Qty: 180 TABLET | Refills: 3 | Status: ACTIVE | COMMUNITY

## 2023-02-17 RX ORDER — EVOLOCUMAB 140 MG/ML
INJECTION, SOLUTION SUBCUTANEOUS
Qty: 6 | Refills: 0 | Status: ON HOLD | COMMUNITY
Start: 2018-04-03

## 2023-02-17 RX ORDER — CODEINE PHOSPHATE AND GUAIFENESIN 10; 100 MG/5ML; MG/5ML
LIQUID ORAL
Qty: 200 | Refills: 0 | Status: ON HOLD | COMMUNITY
Start: 2019-10-16

## 2023-02-17 RX ORDER — DUPILUMAB 300 MG/2ML
INJECTION, SOLUTION SUBCUTANEOUS
Qty: 4 | Refills: 0 | Status: ON HOLD | COMMUNITY
Start: 2018-11-14

## 2023-02-17 RX ORDER — TRAZODONE HYDROCHLORIDE 50 MG/1
1 TABLET AT BEDTIME AS NEEDED TABLET, FILM COATED ORAL ONCE A DAY
Qty: 30 TABLET | Refills: 0 | Status: ACTIVE | COMMUNITY

## 2023-02-17 RX ORDER — CHOLECALCIFEROL (VITAMIN D3) 1250 MCG
TK 1 C PO 1 TIME Q WK CAPSULE ORAL
Qty: 4 | Refills: 0 | Status: ON HOLD | COMMUNITY
Start: 2019-07-10

## 2023-02-17 RX ORDER — SODIUM, POTASSIUM,MAG SULFATES 17.5-3.13G
354ML SOLUTION, RECONSTITUTED, ORAL ORAL
Qty: 354 MILLILITER | Refills: 0 | OUTPATIENT
Start: 2023-02-17 | End: 2023-02-18

## 2023-02-17 NOTE — HPI-TODAY'S VISIT:
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  Liver biopsy performed in 2010 revealed acute and chronic portal inflammation.  Hepatitis B and C were negative.  Antimitochondrial antibody was negative anti-smooth muscle antibody was negative.  Eventually she was diagnosed with sclerosing cholangitis.  Last ERCP was in April 2019 she is cofollowed at [facility].  She had masslike effect of the right hepatic duct on last ERCP but brushings were negative.  Last colonoscopy was in March 2019.  This revealed a 6 mm polyp at the hepatic flexure.  This returned as a hyperplastic polyp with no adenomatous change.  Last upper endoscopy was in October 2019.  This was to assess for varices.  Patient had grade 2 esophageal varices at that time.  In the interim patient has been diagnosed with breast cancer.  She has undergone surgery at [facility].  ERCP was repeated again September 2020.  Findings were largely unchanged from prior ERCP.  Brushings were negative.  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 She underwent left mastectomy last month at [facility].  Lymph nodes were negative. The patient states she is being considered for breast reconstruction surgery.  She otherwise is feeling quite well.  She is back on medications for her IgG4 disease. Recent ERCP showed nearly complete obstruction of both the right and left hepatic duct with some pus coming out of the left hepatic duct system.  Extensive spyglass biopsies along with brushings were performed.  These were all negative for malignancy.  Laboratory testing in February revealed a total bilirubin of 4.9 alkaline phosphatase of 480  ALT 71.  BUN and creatinine normal.  INR 1.1.  Hemoglobin 9.2 with a low MCV of 71.8 and platelets of 107. ERCP also revealed small gastric varices and esophageal varices. Interval history.  The patient has no new complaints today.  She has numerous questions in regards to what criteria are necessary for her to be listed for transplant.  She asked about coming off of ursodeoxycholic acid which I told her would be okay as it is not felt to be significantly beneficial and sclerosing cholangitis in her diseases so advanced at this point it is unlikely to be providing benefit. Interval history.  The patient was admitted to [facility] last week when she presented for urinary stent change and was found to have a total bilirubin of 13 along with an INR of 4.  Communication from [facility] transplant department stated they have now listed for liver transplant.  They are requesting every 2 week laboratory testing.  I have reviewed extensive records from [facility].  Of also had long discussions with [place] transplant physicians as they are also evaluating her for transplant. Further laboratory testing on admission at [facility] on [facility] 10 revealed INR 4.5 total bilirubin 13.8  ALT 94 alkaline phosphatase 526.  Liver transplant team felt that rifampin was the source of the increasing total bilirubin and they discontinued this.  Her meld score was calculated at 34. The patient states now that she has not been listed at [place] but they are reconsidering her for transplant.  She did see [place] last week in regards to transplant.  She states laboratory testing there showed a bilirubin to be 15.  She states that [facility] would like a repeat ERCP.  I am going to discuss her case with both [facility] and [place] in regards to the next step.  She Interval history, 2/17/2023.  Last ERCP in July 2022.  Last CT scan performed last month to follow-up her abscess showed continued fluid collection in the right hepatic dome.  The patient had admissions at [facility] and at [facility] last month for right hepatic abscess.  Drainage catheter was placed at [facility].  Eventually removed.  She developed a transient bile leak percutaneously.  This resolved. The patient states as of last week she was listed for transplant at both [place] and [facility].  Her only complaint currently is morning nausea.  She has been on prednisone chronically 5 mg a day from rheumatology for her IgG4 disease.

## 2023-02-17 NOTE — EXAM-PHYSICAL EXAM
She is alert and oriented to person place and situation no acute distress.  There is positive scleral icterus.

## 2023-03-11 ENCOUNTER — TELEPHONE ENCOUNTER (OUTPATIENT)
Dept: URBAN - METROPOLITAN AREA CLINIC 113 | Facility: CLINIC | Age: 58
End: 2023-03-11

## 2023-03-11 RX ORDER — SULFAMETHOXAZOLE AND TRIMETHOPRIM 800; 160 MG/1; MG/1
1 TABLET TABLET ORAL TWICE A DAY
Qty: 28 TABLET | Refills: 0 | OUTPATIENT
Start: 2023-03-11 | End: 2023-03-21

## 2023-03-17 ENCOUNTER — CLAIMS CREATED FROM THE CLAIM WINDOW (OUTPATIENT)
Dept: URBAN - METROPOLITAN AREA MEDICAL CENTER 43 | Facility: MEDICAL CENTER | Age: 58
End: 2023-03-17
Payer: COMMERCIAL

## 2023-03-17 DIAGNOSIS — D72.829 ELEVATED WBC COUNT: ICD-10-CM

## 2023-03-17 DIAGNOSIS — K74.60 UNSPECIFIED CIRRHOSIS OF LIVER: ICD-10-CM

## 2023-03-17 DIAGNOSIS — K74.69 CIRRHOSIS, CRYPTOGENIC: ICD-10-CM

## 2023-03-17 DIAGNOSIS — J85.2 ABSCESS OF LUNG WITHOUT PNEUMONIA: ICD-10-CM

## 2023-03-17 DIAGNOSIS — K83.01 PRIMARY SCLEROSING CHOLANGITIS: ICD-10-CM

## 2023-03-17 DIAGNOSIS — R74.8 ABNORMAL ALKALINE PHOSPHATASE TEST: ICD-10-CM

## 2023-03-17 PROCEDURE — 99222 1ST HOSP IP/OBS MODERATE 55: CPT | Performed by: INTERNAL MEDICINE

## 2023-03-18 ENCOUNTER — CLAIMS CREATED FROM THE CLAIM WINDOW (OUTPATIENT)
Dept: URBAN - METROPOLITAN AREA MEDICAL CENTER 43 | Facility: MEDICAL CENTER | Age: 58
End: 2023-03-18
Payer: COMMERCIAL

## 2023-03-18 DIAGNOSIS — K74.60 UNSPECIFIED CIRRHOSIS OF LIVER: ICD-10-CM

## 2023-03-18 DIAGNOSIS — J85.2 ABSCESS OF LUNG WITHOUT PNEUMONIA: ICD-10-CM

## 2023-03-18 DIAGNOSIS — K83.01 PRIMARY SCLEROSING CHOLANGITIS: ICD-10-CM

## 2023-03-18 DIAGNOSIS — K74.69 CIRRHOSIS, CRYPTOGENIC: ICD-10-CM

## 2023-03-18 PROCEDURE — 99232 SBSQ HOSP IP/OBS MODERATE 35: CPT | Performed by: INTERNAL MEDICINE

## 2023-03-19 ENCOUNTER — CLAIMS CREATED FROM THE CLAIM WINDOW (OUTPATIENT)
Dept: URBAN - METROPOLITAN AREA MEDICAL CENTER 43 | Facility: MEDICAL CENTER | Age: 58
End: 2023-03-19
Payer: COMMERCIAL

## 2023-03-19 DIAGNOSIS — Z87.19 ESOPHAGEAL FOOD BOLUS: ICD-10-CM

## 2023-03-19 DIAGNOSIS — J85.2 ABSCESS OF LUNG WITHOUT PNEUMONIA: ICD-10-CM

## 2023-03-19 DIAGNOSIS — K83.01 PRIMARY SCLEROSING CHOLANGITIS: ICD-10-CM

## 2023-03-19 DIAGNOSIS — K74.69 CIRRHOSIS, CRYPTOGENIC: ICD-10-CM

## 2023-03-19 DIAGNOSIS — K74.60 UNSPECIFIED CIRRHOSIS OF LIVER: ICD-10-CM

## 2023-03-19 PROCEDURE — 99232 SBSQ HOSP IP/OBS MODERATE 35: CPT | Performed by: INTERNAL MEDICINE

## 2023-03-20 ENCOUNTER — CLAIMS CREATED FROM THE CLAIM WINDOW (OUTPATIENT)
Dept: URBAN - METROPOLITAN AREA MEDICAL CENTER 43 | Facility: MEDICAL CENTER | Age: 58
End: 2023-03-20
Payer: COMMERCIAL

## 2023-03-20 DIAGNOSIS — J85.2 ABSCESS OF LUNG WITHOUT PNEUMONIA: ICD-10-CM

## 2023-03-20 DIAGNOSIS — Z87.19 ESOPHAGEAL FOOD BOLUS: ICD-10-CM

## 2023-03-20 DIAGNOSIS — K74.60 UNSPECIFIED CIRRHOSIS OF LIVER: ICD-10-CM

## 2023-03-20 DIAGNOSIS — K83.01 PRIMARY SCLEROSING CHOLANGITIS: ICD-10-CM

## 2023-03-20 DIAGNOSIS — K74.69 CIRRHOSIS, CRYPTOGENIC: ICD-10-CM

## 2023-03-20 PROCEDURE — 99232 SBSQ HOSP IP/OBS MODERATE 35: CPT | Performed by: INTERNAL MEDICINE

## 2023-04-06 ENCOUNTER — LAB OUTSIDE AN ENCOUNTER (OUTPATIENT)
Dept: URBAN - METROPOLITAN AREA CLINIC 113 | Facility: CLINIC | Age: 58
End: 2023-04-06

## 2023-04-06 ENCOUNTER — TELEPHONE ENCOUNTER (OUTPATIENT)
Dept: URBAN - METROPOLITAN AREA CLINIC 113 | Facility: CLINIC | Age: 58
End: 2023-04-06

## 2023-04-13 ENCOUNTER — TELEPHONE ENCOUNTER (OUTPATIENT)
Dept: URBAN - METROPOLITAN AREA CLINIC 113 | Facility: CLINIC | Age: 58
End: 2023-04-13

## 2023-04-14 ENCOUNTER — TELEPHONE ENCOUNTER (OUTPATIENT)
Dept: URBAN - METROPOLITAN AREA CLINIC 113 | Facility: CLINIC | Age: 58
End: 2023-04-14

## 2023-04-14 ENCOUNTER — TELEPHONE ENCOUNTER (OUTPATIENT)
Dept: URBAN - METROPOLITAN AREA CLINIC 92 | Facility: CLINIC | Age: 58
End: 2023-04-14

## 2023-04-14 RX ORDER — CYCLOBENZAPRINE HYDROCHLORIDE 10 MG/1
1 TABLET EVERY 8 HOURS AS NEEDED TABLET, FILM COATED ORAL EVERY 8 HOURS
Qty: 45 | Refills: 1 | OUTPATIENT
Start: 2023-04-14 | End: 2023-05-14

## 2023-04-19 ENCOUNTER — OFFICE VISIT (OUTPATIENT)
Dept: URBAN - METROPOLITAN AREA SURGERY CENTER 25 | Facility: SURGERY CENTER | Age: 58
End: 2023-04-19

## 2023-04-26 ENCOUNTER — TELEPHONE ENCOUNTER (OUTPATIENT)
Dept: URBAN - METROPOLITAN AREA CLINIC 113 | Facility: CLINIC | Age: 58
End: 2023-04-26

## 2023-04-27 ENCOUNTER — LAB OUTSIDE AN ENCOUNTER (OUTPATIENT)
Dept: URBAN - METROPOLITAN AREA CLINIC 113 | Facility: CLINIC | Age: 58
End: 2023-04-27

## 2023-05-02 ENCOUNTER — TELEPHONE ENCOUNTER (OUTPATIENT)
Dept: URBAN - METROPOLITAN AREA CLINIC 113 | Facility: CLINIC | Age: 58
End: 2023-05-02

## 2023-05-04 ENCOUNTER — OFFICE VISIT (OUTPATIENT)
Dept: URBAN - METROPOLITAN AREA MEDICAL CENTER 19 | Facility: MEDICAL CENTER | Age: 58
End: 2023-05-04
Payer: COMMERCIAL

## 2023-05-04 DIAGNOSIS — K83.01 PSC (PRIMARY SCLEROSING CHOLANGITIS): ICD-10-CM

## 2023-05-04 DIAGNOSIS — R17 JAUNDICE: ICD-10-CM

## 2023-05-04 DIAGNOSIS — K83.8 ACQUIRED DILATION OF BILE DUCT: ICD-10-CM

## 2023-05-04 DIAGNOSIS — K83.1 AMPULLA OF VATER OBSTRUCTION SYNDROME: ICD-10-CM

## 2023-05-04 PROCEDURE — 74328 X-RAY BILE DUCT ENDOSCOPY: CPT | Performed by: INTERNAL MEDICINE

## 2023-05-04 PROCEDURE — 43260 ERCP W/SPECIMEN COLLECTION: CPT | Performed by: INTERNAL MEDICINE

## 2023-05-04 RX ORDER — FAMOTIDINE 20 MG/1
1 TABLET AT BEDTIME AS NEEDED TABLET, FILM COATED ORAL TWICE A DAY
Qty: 180 TABLET | Refills: 3 | Status: ACTIVE | COMMUNITY

## 2023-05-04 RX ORDER — CYCLOBENZAPRINE HYDROCHLORIDE 10 MG/1
1 TABLET EVERY 8 HOURS AS NEEDED TABLET, FILM COATED ORAL EVERY 8 HOURS
Qty: 45 | Refills: 1 | Status: ACTIVE | COMMUNITY
Start: 2023-04-14 | End: 2023-05-14

## 2023-05-04 RX ORDER — TRAZODONE HYDROCHLORIDE 50 MG/1
1 TABLET AT BEDTIME AS NEEDED TABLET, FILM COATED ORAL ONCE A DAY
Qty: 30 TABLET | Refills: 0 | Status: ACTIVE | COMMUNITY

## 2023-05-04 RX ORDER — TERBINAFINE HYDROCHLORIDE 250 MG/1
TABLET ORAL
Qty: 30 | Refills: 0 | Status: ON HOLD | COMMUNITY
Start: 2019-08-09

## 2023-05-04 RX ORDER — URSODIOL 300 MG/1
TAKE 1 CAPSULE BY MOUTH THREE TIMES DAILY CAPSULE ORAL
Qty: 270 CAPSULE | Refills: 12 | Status: ACTIVE | COMMUNITY

## 2023-05-04 RX ORDER — DUPILUMAB 300 MG/2ML
INJECTION, SOLUTION SUBCUTANEOUS
Qty: 4 | Refills: 0 | Status: ON HOLD | COMMUNITY
Start: 2018-11-14

## 2023-05-04 RX ORDER — CODEINE PHOSPHATE AND GUAIFENESIN 10; 100 MG/5ML; MG/5ML
LIQUID ORAL
Qty: 200 | Refills: 0 | Status: ON HOLD | COMMUNITY
Start: 2019-10-16

## 2023-05-04 RX ORDER — CHOLECALCIFEROL (VITAMIN D3) 1250 MCG
TK 1 C PO 1 TIME Q WK CAPSULE ORAL
Qty: 4 | Refills: 0 | Status: ON HOLD | COMMUNITY
Start: 2019-07-10

## 2023-05-04 RX ORDER — PREDNISONE 10 MG/1
TABLET ORAL
Qty: 21 | Refills: 0 | Status: ON HOLD | COMMUNITY
Start: 2018-01-30

## 2023-05-04 RX ORDER — TACROLIMUS 0.3 MG/G
APP TO HANDS AND FEET BID PRN OINTMENT TOPICAL
Qty: 30 | Refills: 0 | Status: ON HOLD | COMMUNITY
Start: 2018-09-19

## 2023-05-04 RX ORDER — PHYTONADIONE (VIT K1) 100 MCG
AS DIRECTED TABLET ORAL ONCE A DAY
Qty: 90 | Refills: 2 | Status: ON HOLD | COMMUNITY

## 2023-05-04 RX ORDER — EVOLOCUMAB 140 MG/ML
BI WEEKLY INJECTION, SOLUTION SUBCUTANEOUS
Refills: 0 | Status: ON HOLD | COMMUNITY

## 2023-05-04 RX ORDER — PREDNISONE 5 MG/1
1 TABLET TABLET ORAL ONCE A DAY
Status: ACTIVE | COMMUNITY

## 2023-05-04 RX ORDER — EVOLOCUMAB 140 MG/ML
INJECTION, SOLUTION SUBCUTANEOUS
Qty: 6 | Refills: 0 | Status: ON HOLD | COMMUNITY
Start: 2018-04-03

## 2023-05-04 RX ORDER — ONDANSETRON HYDROCHLORIDE 4 MG/1
1 TABLET TABLET, FILM COATED ORAL
Qty: 30 | Refills: 1 | Status: ACTIVE | COMMUNITY
Start: 2022-07-09

## 2023-05-04 RX ORDER — TRIAMCINOLONE ACETONIDE 1 MG/G
CREAM TOPICAL
Qty: 454 | Refills: 0 | Status: ON HOLD | COMMUNITY
Start: 2019-03-14

## 2023-05-04 RX ORDER — CIPROFLOXACIN HYDROCHLORIDE 500 MG/1
1 TABLET TABLET, FILM COATED ORAL
Qty: 14 TABLET | Refills: 0 | OUTPATIENT
Start: 2023-05-04 | End: 2023-05-11

## 2023-05-04 RX ORDER — SPIRONOLACTONE 50 MG/1
TABLET, FILM COATED ORAL
Qty: 90 | Refills: 0 | Status: ON HOLD | COMMUNITY
Start: 2018-01-18

## 2023-05-04 RX ORDER — MYCOPHENOLATE MOFETIL 250 MG/1
2 CAPSULES CAPSULE ORAL ONCE A DAY
Status: ACTIVE | COMMUNITY

## 2023-05-04 RX ORDER — SERTRALINE HYDROCHLORIDE 50 MG/1
1 TABLET TABLET, FILM COATED ORAL ONCE A DAY
Qty: 30 | Refills: 3 | Status: ON HOLD | COMMUNITY

## 2023-05-04 RX ORDER — FERROUS SULFATE 325(65) MG
1 TABLET TABLET ORAL
Qty: 30 | Refills: 3 | Status: ACTIVE | COMMUNITY

## 2023-05-15 ENCOUNTER — TELEPHONE ENCOUNTER (OUTPATIENT)
Dept: URBAN - METROPOLITAN AREA CLINIC 113 | Facility: CLINIC | Age: 58
End: 2023-05-15

## 2023-07-28 ENCOUNTER — TELEPHONE ENCOUNTER (OUTPATIENT)
Dept: URBAN - METROPOLITAN AREA CLINIC 113 | Facility: CLINIC | Age: 58
End: 2023-07-28

## 2023-07-28 RX ORDER — URSODIOL 300 MG/1
TAKE 1 CAPSULE BY MOUTH THREE TIMES DAILY CAPSULE ORAL
Qty: 270 CAPSULE | Refills: 12

## 2023-08-25 ENCOUNTER — TELEPHONE ENCOUNTER (OUTPATIENT)
Dept: URBAN - METROPOLITAN AREA CLINIC 113 | Facility: CLINIC | Age: 58
End: 2023-08-25

## 2023-08-28 ENCOUNTER — TELEPHONE ENCOUNTER (OUTPATIENT)
Dept: URBAN - METROPOLITAN AREA CLINIC 113 | Facility: CLINIC | Age: 58
End: 2023-08-28

## 2023-08-29 ENCOUNTER — OFFICE VISIT (OUTPATIENT)
Dept: URBAN - METROPOLITAN AREA CLINIC 113 | Facility: CLINIC | Age: 58
End: 2023-08-29
Payer: COMMERCIAL

## 2023-08-29 ENCOUNTER — TELEPHONE ENCOUNTER (OUTPATIENT)
Dept: URBAN - METROPOLITAN AREA CLINIC 113 | Facility: CLINIC | Age: 58
End: 2023-08-29

## 2023-08-29 ENCOUNTER — LAB OUTSIDE AN ENCOUNTER (OUTPATIENT)
Dept: URBAN - METROPOLITAN AREA CLINIC 113 | Facility: CLINIC | Age: 58
End: 2023-08-29

## 2023-08-29 VITALS
WEIGHT: 193 LBS | SYSTOLIC BLOOD PRESSURE: 128 MMHG | TEMPERATURE: 97.5 F | HEART RATE: 76 BPM | BODY MASS INDEX: 30.29 KG/M2 | RESPIRATION RATE: 18 BRPM | DIASTOLIC BLOOD PRESSURE: 85 MMHG | HEIGHT: 67 IN

## 2023-08-29 DIAGNOSIS — R11.0 NAUSEA: ICD-10-CM

## 2023-08-29 DIAGNOSIS — N18.4 CHRONIC RENAL IMPAIRMENT, STAGE 4 (SEVERE): ICD-10-CM

## 2023-08-29 DIAGNOSIS — C50.919 MALIGNANT NEOPLASM OF FEMALE BREAST, UNSPECIFIED ESTROGEN RECEPTOR STATUS, UNSPECIFIED LATERALITY, UNSPECIFIED SITE OF BREAST: ICD-10-CM

## 2023-08-29 DIAGNOSIS — Z94.4 STATUS POST LIVER TRANSPLANT: ICD-10-CM

## 2023-08-29 DIAGNOSIS — E87.5 HYPERKALEMIA: ICD-10-CM

## 2023-08-29 DIAGNOSIS — R68.83 CHILLS: ICD-10-CM

## 2023-08-29 DIAGNOSIS — R63.0 ANOREXIA: ICD-10-CM

## 2023-08-29 DIAGNOSIS — D89.89 IGG4 RELATED DISEASE: ICD-10-CM

## 2023-08-29 DIAGNOSIS — K83.01 PRIMARY SCLEROSING CHOLANGITIS: ICD-10-CM

## 2023-08-29 PROBLEM — 161671001: Status: ACTIVE | Noted: 2023-08-29

## 2023-08-29 PROBLEM — 431857002: Status: ACTIVE | Noted: 2023-08-29

## 2023-08-29 PROBLEM — 79890006: Status: ACTIVE | Noted: 2023-08-29

## 2023-08-29 PROCEDURE — 99214 OFFICE O/P EST MOD 30 MIN: CPT | Performed by: INTERNAL MEDICINE

## 2023-08-29 RX ORDER — TERBINAFINE HYDROCHLORIDE 250 MG/1
TABLET ORAL
Qty: 30 | Refills: 0 | Status: ON HOLD | COMMUNITY
Start: 2019-08-09

## 2023-08-29 RX ORDER — TACROLIMUS 0.3 MG/G
APP TO HANDS AND FEET BID PRN OINTMENT TOPICAL
Qty: 30 | Refills: 0 | Status: ON HOLD | COMMUNITY
Start: 2018-09-19

## 2023-08-29 RX ORDER — DUPILUMAB 300 MG/2ML
INJECTION, SOLUTION SUBCUTANEOUS
Qty: 4 | Refills: 0 | Status: ON HOLD | COMMUNITY
Start: 2018-11-14

## 2023-08-29 RX ORDER — TACROLIMUS 1 MG/1
9.5 MG CAPSULE, GELATIN COATED ORAL ONCE DAILY
Status: ACTIVE | COMMUNITY

## 2023-08-29 RX ORDER — SPIRONOLACTONE 50 MG/1
TABLET, FILM COATED ORAL
Qty: 90 | Refills: 0 | Status: ON HOLD | COMMUNITY
Start: 2018-01-18

## 2023-08-29 RX ORDER — ONDANSETRON HYDROCHLORIDE 4 MG/1
1 TABLET TABLET, FILM COATED ORAL
Qty: 30 | Refills: 1 | Status: ACTIVE | COMMUNITY
Start: 2022-07-09

## 2023-08-29 RX ORDER — CHOLECALCIFEROL (VITAMIN D3) 1250 MCG
TK 1 C PO 1 TIME Q WK CAPSULE ORAL
Qty: 4 | Refills: 0 | Status: ON HOLD | COMMUNITY
Start: 2019-07-10

## 2023-08-29 RX ORDER — FERROUS SULFATE 325(65) MG
1 TABLET TABLET ORAL
Qty: 30 | Refills: 3 | Status: DISCONTINUED | COMMUNITY

## 2023-08-29 RX ORDER — MYCOPHENOLATE MOFETIL 500 MG/1
2 CAPSULES TABLET, FILM COATED ORAL ONCE A DAY
Status: ACTIVE | COMMUNITY

## 2023-08-29 RX ORDER — FAMOTIDINE 20 MG/1
1 TABLET AT BEDTIME AS NEEDED TABLET, FILM COATED ORAL TWICE A DAY
Qty: 180 TABLET | Refills: 3 | Status: DISCONTINUED | COMMUNITY

## 2023-08-29 RX ORDER — TRIAMCINOLONE ACETONIDE 1 MG/G
CREAM TOPICAL
Qty: 454 | Refills: 0 | Status: ON HOLD | COMMUNITY
Start: 2019-03-14

## 2023-08-29 RX ORDER — SERTRALINE HYDROCHLORIDE 50 MG/1
1 TABLET TABLET, FILM COATED ORAL ONCE A DAY
Qty: 30 | Refills: 3 | Status: ON HOLD | COMMUNITY

## 2023-08-29 RX ORDER — CODEINE PHOSPHATE AND GUAIFENESIN 10; 100 MG/5ML; MG/5ML
LIQUID ORAL
Qty: 200 | Refills: 0 | Status: ON HOLD | COMMUNITY
Start: 2019-10-16

## 2023-08-29 RX ORDER — TRAZODONE HYDROCHLORIDE 50 MG/1
2 TABLET AT BEDTIME AS NEEDED TABLET, FILM COATED ORAL ONCE A DAY
Qty: 60 TABLET | Refills: 0 | Status: ACTIVE | COMMUNITY

## 2023-08-29 RX ORDER — VALGANCICLOVIR 450 MG/1
1 TABLET WITH A MEAL TABLET, FILM COATED ORAL ONCE A DAY
Status: ACTIVE | COMMUNITY
Start: 2023-08-29

## 2023-08-29 RX ORDER — EVOLOCUMAB 140 MG/ML
INJECTION, SOLUTION SUBCUTANEOUS
Qty: 6 | Refills: 0 | Status: ON HOLD | COMMUNITY
Start: 2018-04-03

## 2023-08-29 RX ORDER — PANTOPRAZOLE SODIUM 40 MG/1
1 TABLET TABLET, DELAYED RELEASE ORAL TWICE A DAY
Status: ACTIVE | COMMUNITY

## 2023-08-29 RX ORDER — FLUCONAZOLE 100 MG/1
1 TABLET TABLET ORAL DAILY
Status: ACTIVE | COMMUNITY
Start: 2023-08-29

## 2023-08-29 RX ORDER — PREDNISONE 10 MG/1
TABLET ORAL
Qty: 21 | Refills: 0 | Status: ON HOLD | COMMUNITY
Start: 2018-01-30

## 2023-08-29 RX ORDER — URSODIOL 300 MG/1
TAKE 1 CAPSULE BY MOUTH THREE TIMES DAILY CAPSULE ORAL
Qty: 270 CAPSULE | Refills: 12 | Status: DISCONTINUED | COMMUNITY

## 2023-08-29 RX ORDER — PHYTONADIONE (VIT K1) 100 MCG
AS DIRECTED TABLET ORAL ONCE A DAY
Qty: 90 | Refills: 2 | Status: ON HOLD | COMMUNITY

## 2023-08-29 RX ORDER — EVOLOCUMAB 140 MG/ML
BI WEEKLY INJECTION, SOLUTION SUBCUTANEOUS
Refills: 0 | Status: ON HOLD | COMMUNITY

## 2023-08-29 RX ORDER — PREDNISONE 5 MG/1
1 TABLET TABLET ORAL ONCE A DAY
Status: ACTIVE | COMMUNITY

## 2023-08-29 NOTE — HPI-TODAY'S VISIT:
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  Liver biopsy performed in  revealed acute and chronic portal inflammation.  Hepatitis B and C were negative.  Antimitochondrial antibody was negative anti-smooth muscle antibody was negative.  Eventually she was diagnosed with sclerosing cholangitis.  Last ERCP was in 2019 she is cofollowed at [facility].  She had masslike effect of the right hepatic duct on last ERCP but brushings were negative.  Last colonoscopy was in 2019.  This revealed a 6 mm polyp at the hepatic flexure.  This returned as a hyperplastic polyp with no adenomatous change.  Last upper endoscopy was in 2019.  This was to assess for varices.  Patient had grade 2 esophageal varices at that time.  In the interim patient has been diagnosed with breast cancer.  She has undergone surgery at [facility].  ERCP was repeated again 2020.  Findings were largely unchanged from prior ERCP.  Brushings were negative.  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 She underwent left mastectomy last month at [facility].  Lymph nodes were negative. The patient states she is being considered for breast reconstruction surgery.  She otherwise is feeling quite well.  She is back on medications for her IgG4 disease. Recent ERCP showed nearly complete obstruction of both the right and left hepatic duct with some pus coming out of the left hepatic duct system.  Extensive spyglass biopsies along with brushings were performed.  These were all negative for malignancy.  Laboratory testing in February revealed a total bilirubin of 4.9 alkaline phosphatase of 480  ALT 71.  BUN and creatinine normal.  INR 1.1.  Hemoglobin 9.2 with a low MCV of 71.8 and platelets of 107. ERCP also revealed small gastric varices and esophageal varices. Interval history.  The patient has no new complaints today.  She has numerous questions in regards to what criteria are necessary for her to be listed for transplant.  She asked about coming off of ursodeoxycholic acid which I told her would be okay as it is not felt to be significantly beneficial and sclerosing cholangitis in her diseases so advanced at this point it is unlikely to be providing benefit. Interval history.  The patient was admitted to [facility] last week when she presented for urinary stent change and was found to have a total bilirubin of 13 along with an INR of 4.  Communication from [facility] transplant department stated they have now listed for liver transplant.  They are requesting every 2 week laboratory testing.  I have reviewed extensive records from [facility].  Of also had long discussions with [place] transplant physicians as they are also evaluating her for transplant. Further laboratory testing on admission at [facility] on [facility] 10 revealed INR 4.5 total bilirubin 13.8  ALT 94 alkaline phosphatase 526.  Liver transplant team felt that rifampin was the source of the increasing total bilirubin and they discontinued this.  Her meld score was calculated at 34. The patient states now that she has not been listed at [place] but they are reconsidering her for transplant.  She did see [place] last week in regards to transplant.  She states laboratory testing there showed a bilirubin to be 15.  She states that [facility] would like a repeat ERCP.  I am going to discuss her case with both [facility] and [place] in regards to the next step.  She Interval history, 2023.  Last ERCP in 2022.  Last CT scan performed last month to follow-up her abscess showed continued fluid collection in the right hepatic dome.  The patient had admissions at [facility] and at [facility] last month for right hepatic abscess.  Drainage catheter was placed at [facility].  Eventually removed.  She developed a transient bile leak percutaneously.  This resolved. The patient states as of last week she was listed for transplant at both [place] and [facility].  Her only complaint currently is morning nausea.  She has been on prednisone chronically 5 mg a day from rheumatology for her IgG4 disease. Interval history, 2023.  The patient underwent ERCP May 4 of this year.  This study was consistent with her prior known PSC.  Brushings were negative for malignancy.  She had grade 2 varices at that time. Patient then underwent liver transplant  of this year.  She received a full size  donation liver with Roma-en-Y biliary anastomosis.  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  Patient received IV Solu-Medrol.  Fluconazole was started to increase tacrolimus levels.  Repeat liver biopsy was then performed on  showing once again moderate acute cellular rejection the patient received another dose of Solu-Medrol.  Repeat liver biopsy on  showed marked improvement.  MRCP on  revealed no ductal dilatation or significant fluid collections. Laboratory testing on  revealed hemoglobin 9.6 which was stable from the week prior.  Platelets of 143 which was slightly lower from 153 the week prior.  White cell count was 4.4.  Potassium was 5.7.  BUN 69 creatinine 2.1.  The week before BUN was 57 and creatinine 1.8.  Alkaline phosphatase decreased from 2 15-1 99 and total bilirubin from 1.1-0.9.  Transaminases were normal.  Albumin was 4.4.  Tacrolimus level was 12.6. The patient states that since transplant she has had persistent problems of nausea and anorexia.  She has no dysphagia.  No vomiting with eating.  She states she simply has no appetite.  She does not have any burning with urination except on rare occasions.  She has no shortness of breath or chest pain.  She does have chills but no fever that she knows of.  She states originally she was hypokalemic and was given magnesium and potassium and now she has become hyperkalemic.  Her BUN pretransplant was 9 and creatinine 0.6.

## 2023-08-29 NOTE — EXAM-PHYSICAL EXAM
She is alert and oriented to person place and situation no acute distress.  She does have some minimal JVD.  Heart reveals a regular rate and rhythm with a 2/6 systolic murmur best heard at the right sternal border.  Slight S3-S4 gallop.  Abdomen is soft nondistended and nontender.

## 2023-08-30 ENCOUNTER — TELEPHONE ENCOUNTER (OUTPATIENT)
Dept: URBAN - METROPOLITAN AREA CLINIC 113 | Facility: CLINIC | Age: 58
End: 2023-08-30

## 2023-08-30 RX ORDER — TRAZODONE HYDROCHLORIDE 100 MG/1
1 TABLET AT BEDTIME TABLET ORAL ONCE A DAY
Qty: 30 | Refills: 3 | OUTPATIENT
Start: 2023-08-30

## 2023-08-31 ENCOUNTER — TELEPHONE ENCOUNTER (OUTPATIENT)
Dept: URBAN - METROPOLITAN AREA CLINIC 113 | Facility: CLINIC | Age: 58
End: 2023-08-31

## 2023-08-31 ENCOUNTER — LAB OUTSIDE AN ENCOUNTER (OUTPATIENT)
Dept: URBAN - METROPOLITAN AREA CLINIC 113 | Facility: CLINIC | Age: 58
End: 2023-08-31

## 2023-09-02 ENCOUNTER — CLAIMS CREATED FROM THE CLAIM WINDOW (OUTPATIENT)
Dept: URBAN - METROPOLITAN AREA MEDICAL CENTER 43 | Facility: MEDICAL CENTER | Age: 58
End: 2023-09-02
Payer: COMMERCIAL

## 2023-09-02 DIAGNOSIS — R11.2 NAUSEA WITH VOMITING, UNSPECIFIED: ICD-10-CM

## 2023-09-02 PROCEDURE — 99221 1ST HOSP IP/OBS SF/LOW 40: CPT | Performed by: INTERNAL MEDICINE

## 2023-09-02 PROCEDURE — 99253 IP/OBS CNSLTJ NEW/EST LOW 45: CPT | Performed by: INTERNAL MEDICINE

## 2023-09-03 ENCOUNTER — CLAIMS CREATED FROM THE CLAIM WINDOW (OUTPATIENT)
Dept: URBAN - METROPOLITAN AREA MEDICAL CENTER 43 | Facility: MEDICAL CENTER | Age: 58
End: 2023-09-03
Payer: COMMERCIAL

## 2023-09-03 DIAGNOSIS — R11.2 NAUSEA WITH VOMITING, UNSPECIFIED: ICD-10-CM

## 2023-09-03 DIAGNOSIS — E87.5 HYPERKALEMIA: ICD-10-CM

## 2023-09-03 PROCEDURE — 99232 SBSQ HOSP IP/OBS MODERATE 35: CPT | Performed by: INTERNAL MEDICINE

## 2023-09-22 ENCOUNTER — TELEPHONE ENCOUNTER (OUTPATIENT)
Dept: URBAN - METROPOLITAN AREA CLINIC 113 | Facility: CLINIC | Age: 58
End: 2023-09-22

## 2023-10-03 ENCOUNTER — TELEPHONE ENCOUNTER (OUTPATIENT)
Dept: URBAN - METROPOLITAN AREA CLINIC 113 | Facility: CLINIC | Age: 58
End: 2023-10-03

## 2023-11-29 ENCOUNTER — OFFICE VISIT (OUTPATIENT)
Dept: URBAN - METROPOLITAN AREA CLINIC 113 | Facility: CLINIC | Age: 58
End: 2023-11-29
Payer: COMMERCIAL

## 2023-11-29 ENCOUNTER — LAB OUTSIDE AN ENCOUNTER (OUTPATIENT)
Dept: URBAN - METROPOLITAN AREA CLINIC 113 | Facility: CLINIC | Age: 58
End: 2023-11-29

## 2023-11-29 ENCOUNTER — DASHBOARD ENCOUNTERS (OUTPATIENT)
Age: 58
End: 2023-11-29

## 2023-11-29 VITALS
RESPIRATION RATE: 18 BRPM | HEIGHT: 67 IN | HEART RATE: 69 BPM | DIASTOLIC BLOOD PRESSURE: 66 MMHG | BODY MASS INDEX: 33.59 KG/M2 | SYSTOLIC BLOOD PRESSURE: 113 MMHG | TEMPERATURE: 97.5 F | WEIGHT: 214 LBS

## 2023-11-29 DIAGNOSIS — E53.8 B12 DEFICIENCY: ICD-10-CM

## 2023-11-29 DIAGNOSIS — D89.89 IGG4 RELATED DISEASE: ICD-10-CM

## 2023-11-29 DIAGNOSIS — N18.4 CHRONIC RENAL IMPAIRMENT, STAGE 4 (SEVERE): ICD-10-CM

## 2023-11-29 DIAGNOSIS — Z94.4 STATUS POST LIVER TRANSPLANT: ICD-10-CM

## 2023-11-29 DIAGNOSIS — Z12.11 COLON CANCER SCREENING: ICD-10-CM

## 2023-11-29 DIAGNOSIS — C50.919 MALIGNANT NEOPLASM OF FEMALE BREAST, UNSPECIFIED ESTROGEN RECEPTOR STATUS, UNSPECIFIED LATERALITY, UNSPECIFIED SITE OF BREAST: ICD-10-CM

## 2023-11-29 DIAGNOSIS — K83.01 PRIMARY SCLEROSING CHOLANGITIS: ICD-10-CM

## 2023-11-29 PROCEDURE — 99214 OFFICE O/P EST MOD 30 MIN: CPT | Performed by: INTERNAL MEDICINE

## 2023-11-29 RX ORDER — TRAZODONE HYDROCHLORIDE 100 MG/1
1 TABLET AT BEDTIME TABLET ORAL ONCE A DAY
Qty: 30 | Refills: 3 | Status: ACTIVE | COMMUNITY
Start: 2023-08-30

## 2023-11-29 RX ORDER — PREDNISONE 10 MG/1
TABLET ORAL
Qty: 21 | Refills: 0 | Status: ON HOLD | COMMUNITY
Start: 2018-01-30

## 2023-11-29 RX ORDER — ONDANSETRON HYDROCHLORIDE 4 MG/1
1 TABLET TABLET, FILM COATED ORAL
Qty: 30 | Refills: 1 | Status: ON HOLD | COMMUNITY
Start: 2022-07-09

## 2023-11-29 RX ORDER — TACROLIMUS 1 MG/1
9.5 MG CAPSULE, GELATIN COATED ORAL ONCE DAILY
Status: ACTIVE | COMMUNITY

## 2023-11-29 RX ORDER — SERTRALINE HYDROCHLORIDE 50 MG/1
1 TABLET TABLET, FILM COATED ORAL ONCE A DAY
Qty: 30 | Refills: 3 | Status: ON HOLD | COMMUNITY

## 2023-11-29 RX ORDER — VALGANCICLOVIR 450 MG/1
1 TABLET WITH A MEAL TABLET, FILM COATED ORAL ONCE A DAY
Status: ACTIVE | COMMUNITY
Start: 2023-08-29

## 2023-11-29 RX ORDER — CHOLECALCIFEROL (VITAMIN D3) 1250 MCG
TK 1 C PO 1 TIME Q WK CAPSULE ORAL
Qty: 4 | Refills: 0 | Status: ON HOLD | COMMUNITY
Start: 2019-07-10

## 2023-11-29 RX ORDER — FLUCONAZOLE 100 MG/1
1 TABLET TABLET ORAL DAILY
Status: ACTIVE | COMMUNITY
Start: 2023-08-29

## 2023-11-29 RX ORDER — SPIRONOLACTONE 50 MG/1
TABLET, FILM COATED ORAL
Qty: 90 | Refills: 0 | Status: ON HOLD | COMMUNITY
Start: 2018-01-18

## 2023-11-29 RX ORDER — PANTOPRAZOLE SODIUM 40 MG/1
1 TABLET TABLET, DELAYED RELEASE ORAL TWICE A DAY
Status: ACTIVE | COMMUNITY

## 2023-11-29 RX ORDER — TACROLIMUS 0.3 MG/G
APP TO HANDS AND FEET BID PRN OINTMENT TOPICAL
Qty: 30 | Refills: 0 | Status: ON HOLD | COMMUNITY
Start: 2018-09-19

## 2023-11-29 RX ORDER — EVOLOCUMAB 140 MG/ML
BI WEEKLY INJECTION, SOLUTION SUBCUTANEOUS
Refills: 0 | Status: ON HOLD | COMMUNITY

## 2023-11-29 RX ORDER — PREDNISONE 5 MG/1
1 TABLET TABLET ORAL ONCE A DAY
Status: ACTIVE | COMMUNITY

## 2023-11-29 RX ORDER — EVOLOCUMAB 140 MG/ML
INJECTION, SOLUTION SUBCUTANEOUS
Qty: 6 | Refills: 0 | Status: ON HOLD | COMMUNITY
Start: 2018-04-03

## 2023-11-29 RX ORDER — TRIAMCINOLONE ACETONIDE 1 MG/G
CREAM TOPICAL
Qty: 454 | Refills: 0 | Status: ON HOLD | COMMUNITY
Start: 2019-03-14

## 2023-11-29 RX ORDER — TERBINAFINE HYDROCHLORIDE 250 MG/1
TABLET ORAL
Qty: 30 | Refills: 0 | Status: ON HOLD | COMMUNITY
Start: 2019-08-09

## 2023-11-29 RX ORDER — MYCOPHENOLATE MOFETIL 500 MG/1
2 CAPSULES TABLET, FILM COATED ORAL ONCE A DAY
Status: ACTIVE | COMMUNITY

## 2023-11-29 RX ORDER — CODEINE PHOSPHATE AND GUAIFENESIN 10; 100 MG/5ML; MG/5ML
LIQUID ORAL
Qty: 200 | Refills: 0 | Status: ON HOLD | COMMUNITY
Start: 2019-10-16

## 2023-11-29 RX ORDER — DUPILUMAB 300 MG/2ML
INJECTION, SOLUTION SUBCUTANEOUS
Qty: 4 | Refills: 0 | Status: ON HOLD | COMMUNITY
Start: 2018-11-14

## 2023-11-29 RX ORDER — PHYTONADIONE (VIT K1) 100 MCG
AS DIRECTED TABLET ORAL ONCE A DAY
Qty: 90 | Refills: 2 | Status: ON HOLD | COMMUNITY

## 2023-11-29 NOTE — HPI-TODAY'S VISIT:
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  Liver biopsy performed in  revealed acute and chronic portal inflammation.  Hepatitis B and C were negative.  Antimitochondrial antibody was negative anti-smooth muscle antibody was negative.  Eventually she was diagnosed with sclerosing cholangitis.  Last ERCP was in 2019 she is cofollowed at [facility].  She had masslike effect of the right hepatic duct on last ERCP but brushings were negative.  Last colonoscopy was in 2019.  This revealed a 6 mm polyp at the hepatic flexure.  This returned as a hyperplastic polyp with no adenomatous change.  Last upper endoscopy was in 2019.  This was to assess for varices.  Patient had grade 2 esophageal varices at that time.  In the interim patient has been diagnosed with breast cancer.  She has undergone surgery at [facility].  ERCP was repeated again 2020.  Findings were largely unchanged from prior ERCP.  Brushings were negative.  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 She underwent left mastectomy last month at [facility].  Lymph nodes were negative. The patient states she is being considered for breast reconstruction surgery.  She otherwise is feeling quite well.  She is back on medications for her IgG4 disease. Recent ERCP showed nearly complete obstruction of both the right and left hepatic duct with some pus coming out of the left hepatic duct system.  Extensive spyglass biopsies along with brushings were performed.  These were all negative for malignancy.  Laboratory testing in February revealed a total bilirubin of 4.9 alkaline phosphatase of 480  ALT 71.  BUN and creatinine normal.  INR 1.1.  Hemoglobin 9.2 with a low MCV of 71.8 and platelets of 107. ERCP also revealed small gastric varices and esophageal varices. Interval history.  The patient has no new complaints today.  She has numerous questions in regards to what criteria are necessary for her to be listed for transplant.  She asked about coming off of ursodeoxycholic acid which I told her would be okay as it is not felt to be significantly beneficial and sclerosing cholangitis in her diseases so advanced at this point it is unlikely to be providing benefit. Interval history.  The patient was admitted to [facility] last week when she presented for urinary stent change and was found to have a total bilirubin of 13 along with an INR of 4.  Communication from [facility] transplant department stated they have now listed for liver transplant.  They are requesting every 2 week laboratory testing.  I have reviewed extensive records from [facility].  Of also had long discussions with [place] transplant physicians as they are also evaluating her for transplant. Further laboratory testing on admission at [facility] on [facility] 10 revealed INR 4.5 total bilirubin 13.8  ALT 94 alkaline phosphatase 526.  Liver transplant team felt that rifampin was the source of the increasing total bilirubin and they discontinued this.  Her meld score was calculated at 34. The patient states now that she has not been listed at [facility] but they are reconsidering her for transplant.  She did see [place] last week in regards to transplant.  She states laboratory testing there showed a bilirubin to be 15.  She states that [facility] would like a repeat ERCP.  I am going to discuss her case with both [facility] and [place] in regards to the next step.  She Interval history, 2023.  Last ERCP in 2022.  Last CT scan performed last month to follow-up her abscess showed continued fluid collection in the right hepatic dome.  The patient had admissions at [facility] and at [facility] last month for right hepatic abscess.  Drainage catheter was placed at [facility].  Eventually removed.  She developed a transient bile leak percutaneously.  This resolved. The patient states as of last week she was listed for transplant at both [place] and [facility].  Her only complaint currently is morning nausea.  She has been on prednisone chronically 5 mg a day from rheumatology for her IgG4 disease. Interval history, 2023.  The patient underwent ERCP May 4 of this year.  This study was consistent with her prior known PSC.  Brushings were negative for malignancy.  She had grade 2 varices at that time. Patient then underwent liver transplant  of this year.  She received a full size  donation liver with Roma-en-Y biliary anastomosis.  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  Patient received IV Solu-Medrol.  Fluconazole was started to increase tacrolimus levels.  Repeat liver biopsy was then performed on  showing once again moderate acute cellular rejection the patient received another dose of Solu-Medrol.  Repeat liver biopsy on  showed marked improvement.  MRCP on  revealed no ductal dilatation or significant fluid collections. Laboratory testing on  revealed hemoglobin 9.6 which was stable from the week prior.  Platelets of 143 which was slightly lower from 153 the week prior.  White cell count was 4.4.  Potassium was 5.7.  BUN 69 creatinine 2.1.  The week before BUN was 57 and creatinine 1.8.  Alkaline phosphatase decreased from 2 15-1 99 and total bilirubin from 1.1-0.9.  Transaminases were normal.  Albumin was 4.4.  Tacrolimus level was 12.6. The patient states that since transplant she has had persistent problems of nausea and anorexia.  She has no dysphagia.  No vomiting with eating.  She states she simply has no appetite.  She does not have any burning with urination except on rare occasions.  She has no shortness of breath or chest pain.  She does have chills but no fever that she knows of.  She states originally she was hypokalemic and was given magnesium and potassium and now she has become hyperkalemic.  Her BUN pretransplant was 9 and creatinine 0.6. Interval history, 2023.  Laboratory testing from  of this year revealed urine cultures positive for gram-positive jose alfredo.  CMP revealed BUN 50 creatinine 2.3 potassium 6.5 and alkaline phosphatase 154 otherwise normal CMP.  Urinalysis revealed greater than 900 red blood cells.  Elevated white cells. Laboratory testing from  of this year revealed white cell count 4.6 hemoglobin 10.3 which was stable.  Platelets of 117.  BMP unremarkable other than BUN 36 and creatinine 1.2.  Alkaline phosphatase 129 otherwise normal LFTs.  Glucose 111.  Tacrolimus level 8.  CMV negative. Patient was seen in hospital consultation by our service on  for nausea and vomiting status post hepatic transplantation.  Seen by my partner Dr. Atkins. Patient states overall she is doing quite well.  She does have a sensation of decreased sensation around her incision site.  Otherwise she feels quite well.  She follows up with her nephrologist tomorrow.  She states they have been assessing proteinuria

## 2023-12-04 ENCOUNTER — TELEPHONE ENCOUNTER (OUTPATIENT)
Dept: URBAN - METROPOLITAN AREA CLINIC 113 | Facility: CLINIC | Age: 58
End: 2023-12-04

## 2023-12-05 ENCOUNTER — OFFICE VISIT (OUTPATIENT)
Dept: URBAN - METROPOLITAN AREA SURGERY CENTER 25 | Facility: SURGERY CENTER | Age: 58
End: 2023-12-05

## 2023-12-20 ENCOUNTER — CLAIMS CREATED FROM THE CLAIM WINDOW (OUTPATIENT)
Dept: URBAN - METROPOLITAN AREA SURGERY CENTER 25 | Facility: SURGERY CENTER | Age: 58
End: 2023-12-20
Payer: COMMERCIAL

## 2023-12-20 ENCOUNTER — CLAIMS CREATED FROM THE CLAIM WINDOW (OUTPATIENT)
Dept: URBAN - METROPOLITAN AREA CLINIC 4 | Facility: CLINIC | Age: 58
End: 2023-12-20
Payer: COMMERCIAL

## 2023-12-20 ENCOUNTER — OFFICE VISIT (OUTPATIENT)
Dept: URBAN - METROPOLITAN AREA SURGERY CENTER 25 | Facility: SURGERY CENTER | Age: 58
End: 2023-12-20

## 2023-12-20 ENCOUNTER — TELEPHONE ENCOUNTER (OUTPATIENT)
Dept: URBAN - METROPOLITAN AREA CLINIC 113 | Facility: CLINIC | Age: 58
End: 2023-12-20

## 2023-12-20 DIAGNOSIS — K63.5 BENIGN COLON POLYP: ICD-10-CM

## 2023-12-20 DIAGNOSIS — K63.89 OTHER SPECIFIED DISEASES OF INTESTINE: ICD-10-CM

## 2023-12-20 DIAGNOSIS — Z12.11 COLON CANCER SCREENING (HIGH RISK): ICD-10-CM

## 2023-12-20 DIAGNOSIS — Z86.010 ADENOMAS PERSONAL HISTORY OF COLONIC POLYPS: ICD-10-CM

## 2023-12-20 DIAGNOSIS — D12.3 ADENOMATOUS POLYP OF TRANSVERSE COLON: ICD-10-CM

## 2023-12-20 DIAGNOSIS — Z86.010 PERSONAL HISTORY OF COLON POLYPS: ICD-10-CM

## 2023-12-20 PROCEDURE — G8907 PT DOC NO EVENTS ON DISCHARG: HCPCS | Performed by: INTERNAL MEDICINE

## 2023-12-20 PROCEDURE — 45385 COLONOSCOPY W/LESION REMOVAL: CPT | Performed by: INTERNAL MEDICINE

## 2023-12-20 PROCEDURE — 88305 TISSUE EXAM BY PATHOLOGIST: CPT | Performed by: PATHOLOGY

## 2023-12-20 PROCEDURE — 00811 ANES LWR INTST NDSC NOS: CPT | Performed by: ANESTHESIOLOGIST ASSISTANT

## 2023-12-20 PROCEDURE — 00811 ANES LWR INTST NDSC NOS: CPT | Performed by: ANESTHESIOLOGY

## 2023-12-20 RX ORDER — DUPILUMAB 300 MG/2ML
INJECTION, SOLUTION SUBCUTANEOUS
Qty: 4 | Refills: 0 | Status: ON HOLD | COMMUNITY
Start: 2018-11-14

## 2023-12-20 RX ORDER — VALGANCICLOVIR 450 MG/1
1 TABLET WITH A MEAL TABLET, FILM COATED ORAL ONCE A DAY
Status: ACTIVE | COMMUNITY
Start: 2023-08-29

## 2023-12-20 RX ORDER — PREDNISONE 5 MG/1
1 TABLET TABLET ORAL ONCE A DAY
Status: ACTIVE | COMMUNITY

## 2023-12-20 RX ORDER — TACROLIMUS 0.3 MG/G
APP TO HANDS AND FEET BID PRN OINTMENT TOPICAL
Qty: 30 | Refills: 0 | Status: ON HOLD | COMMUNITY
Start: 2018-09-19

## 2023-12-20 RX ORDER — ONDANSETRON HYDROCHLORIDE 4 MG/1
1 TABLET TABLET, FILM COATED ORAL
Qty: 30 | Refills: 1 | Status: ON HOLD | COMMUNITY
Start: 2022-07-09

## 2023-12-20 RX ORDER — EVOLOCUMAB 140 MG/ML
BI WEEKLY INJECTION, SOLUTION SUBCUTANEOUS
Refills: 0 | Status: ON HOLD | COMMUNITY

## 2023-12-20 RX ORDER — TRAZODONE HYDROCHLORIDE 100 MG/1
1 TABLET AT BEDTIME TABLET ORAL ONCE A DAY
Qty: 30 | Refills: 3 | Status: ACTIVE | COMMUNITY
Start: 2023-08-30

## 2023-12-20 RX ORDER — SPIRONOLACTONE 50 MG/1
TABLET, FILM COATED ORAL
Qty: 90 | Refills: 0 | Status: ON HOLD | COMMUNITY
Start: 2018-01-18

## 2023-12-20 RX ORDER — PHYTONADIONE (VIT K1) 100 MCG
AS DIRECTED TABLET ORAL ONCE A DAY
Qty: 90 | Refills: 2 | Status: ON HOLD | COMMUNITY

## 2023-12-20 RX ORDER — TRIAMCINOLONE ACETONIDE 1 MG/G
CREAM TOPICAL
Qty: 454 | Refills: 0 | Status: ON HOLD | COMMUNITY
Start: 2019-03-14

## 2023-12-20 RX ORDER — SERTRALINE HYDROCHLORIDE 50 MG/1
1 TABLET TABLET, FILM COATED ORAL ONCE A DAY
Qty: 30 | Refills: 3 | Status: ON HOLD | COMMUNITY

## 2023-12-20 RX ORDER — PREDNISONE 10 MG/1
TABLET ORAL
Qty: 21 | Refills: 0 | Status: ON HOLD | COMMUNITY
Start: 2018-01-30

## 2023-12-20 RX ORDER — TACROLIMUS 1 MG/1
9.5 MG CAPSULE, GELATIN COATED ORAL ONCE DAILY
Status: ACTIVE | COMMUNITY

## 2023-12-20 RX ORDER — FLUCONAZOLE 100 MG/1
1 TABLET TABLET ORAL DAILY
Status: ACTIVE | COMMUNITY
Start: 2023-08-29

## 2023-12-20 RX ORDER — PANTOPRAZOLE SODIUM 40 MG/1
1 TABLET TABLET, DELAYED RELEASE ORAL TWICE A DAY
Status: ACTIVE | COMMUNITY

## 2023-12-20 RX ORDER — CHOLECALCIFEROL (VITAMIN D3) 1250 MCG
TK 1 C PO 1 TIME Q WK CAPSULE ORAL
Qty: 4 | Refills: 0 | Status: ON HOLD | COMMUNITY
Start: 2019-07-10

## 2023-12-20 RX ORDER — CODEINE PHOSPHATE AND GUAIFENESIN 10; 100 MG/5ML; MG/5ML
LIQUID ORAL
Qty: 200 | Refills: 0 | Status: ON HOLD | COMMUNITY
Start: 2019-10-16

## 2023-12-20 RX ORDER — TERBINAFINE HYDROCHLORIDE 250 MG/1
TABLET ORAL
Qty: 30 | Refills: 0 | Status: ON HOLD | COMMUNITY
Start: 2019-08-09

## 2023-12-20 RX ORDER — MYCOPHENOLATE MOFETIL 500 MG/1
2 CAPSULES TABLET, FILM COATED ORAL ONCE A DAY
Status: ACTIVE | COMMUNITY

## 2023-12-20 RX ORDER — EVOLOCUMAB 140 MG/ML
INJECTION, SOLUTION SUBCUTANEOUS
Qty: 6 | Refills: 0 | Status: ON HOLD | COMMUNITY
Start: 2018-04-03

## 2024-10-01 ENCOUNTER — TELEPHONE ENCOUNTER (OUTPATIENT)
Dept: URBAN - METROPOLITAN AREA CLINIC 113 | Facility: CLINIC | Age: 59
End: 2024-10-01

## 2024-11-07 ENCOUNTER — OFFICE VISIT (OUTPATIENT)
Dept: URBAN - METROPOLITAN AREA CLINIC 113 | Facility: CLINIC | Age: 59
End: 2024-11-07

## 2024-12-05 ENCOUNTER — LAB OUTSIDE AN ENCOUNTER (OUTPATIENT)
Dept: URBAN - METROPOLITAN AREA CLINIC 113 | Facility: CLINIC | Age: 59
End: 2024-12-05

## 2024-12-05 ENCOUNTER — OFFICE VISIT (OUTPATIENT)
Dept: URBAN - METROPOLITAN AREA CLINIC 113 | Facility: CLINIC | Age: 59
End: 2024-12-05
Payer: COMMERCIAL

## 2024-12-05 VITALS
HEIGHT: 67 IN | WEIGHT: 263 LBS | DIASTOLIC BLOOD PRESSURE: 71 MMHG | BODY MASS INDEX: 41.28 KG/M2 | TEMPERATURE: 97.7 F | RESPIRATION RATE: 18 BRPM | SYSTOLIC BLOOD PRESSURE: 111 MMHG | HEART RATE: 89 BPM

## 2024-12-05 DIAGNOSIS — D50.0 IRON DEFICIENCY ANEMIA DUE TO CHRONIC BLOOD LOSS: ICD-10-CM

## 2024-12-05 DIAGNOSIS — C50.919 MALIGNANT NEOPLASM OF FEMALE BREAST, UNSPECIFIED ESTROGEN RECEPTOR STATUS, UNSPECIFIED LATERALITY, UNSPECIFIED SITE OF BREAST: ICD-10-CM

## 2024-12-05 DIAGNOSIS — E53.8 B12 DEFICIENCY: ICD-10-CM

## 2024-12-05 DIAGNOSIS — K83.01 PRIMARY SCLEROSING CHOLANGITIS: ICD-10-CM

## 2024-12-05 DIAGNOSIS — Z94.4 STATUS POST LIVER TRANSPLANT: ICD-10-CM

## 2024-12-05 DIAGNOSIS — D89.89 IGG4 RELATED DISEASE: ICD-10-CM

## 2024-12-05 PROBLEM — 724556004: Status: ACTIVE | Noted: 2024-12-05

## 2024-12-05 PROCEDURE — 99214 OFFICE O/P EST MOD 30 MIN: CPT | Performed by: INTERNAL MEDICINE

## 2024-12-05 RX ORDER — PANTOPRAZOLE SODIUM 40 MG/1
1 TABLET TABLET, DELAYED RELEASE ORAL TWICE A DAY
Status: ON HOLD | COMMUNITY

## 2024-12-05 RX ORDER — TRIAMCINOLONE ACETONIDE 1 MG/G
CREAM TOPICAL
Qty: 454 | Refills: 0 | Status: ON HOLD | COMMUNITY
Start: 2019-03-14

## 2024-12-05 RX ORDER — CHOLECALCIFEROL (VITAMIN D3) 1250 MCG
TK 1 C PO 1 TIME Q WK CAPSULE ORAL
Qty: 4 | Refills: 0 | Status: ON HOLD | COMMUNITY
Start: 2019-07-10

## 2024-12-05 RX ORDER — EVOLOCUMAB 140 MG/ML
BI WEEKLY INJECTION, SOLUTION SUBCUTANEOUS
Refills: 0 | Status: ON HOLD | COMMUNITY

## 2024-12-05 RX ORDER — EVOLOCUMAB 140 MG/ML
INJECTION, SOLUTION SUBCUTANEOUS
Qty: 6 | Refills: 0 | Status: ON HOLD | COMMUNITY
Start: 2018-04-03

## 2024-12-05 RX ORDER — FLUCONAZOLE 100 MG/1
1 TABLET TABLET ORAL DAILY
Status: ON HOLD | COMMUNITY
Start: 2023-08-29

## 2024-12-05 RX ORDER — TRAZODONE HYDROCHLORIDE 100 MG/1
1 TABLET AT BEDTIME TABLET ORAL ONCE A DAY
Qty: 30 | Refills: 3 | Status: ON HOLD | COMMUNITY
Start: 2023-08-30

## 2024-12-05 RX ORDER — CODEINE PHOSPHATE AND GUAIFENESIN 10; 100 MG/5ML; MG/5ML
LIQUID ORAL
Qty: 200 | Refills: 0 | Status: ON HOLD | COMMUNITY
Start: 2019-10-16

## 2024-12-05 RX ORDER — DUPILUMAB 300 MG/2ML
INJECTION, SOLUTION SUBCUTANEOUS
Qty: 4 | Refills: 0 | Status: ON HOLD | COMMUNITY
Start: 2018-11-14

## 2024-12-05 RX ORDER — SPIRONOLACTONE 50 MG/1
TABLET, FILM COATED ORAL
Qty: 90 | Refills: 0 | Status: ON HOLD | COMMUNITY
Start: 2018-01-18

## 2024-12-05 RX ORDER — PHYTONADIONE (VIT K1) 100 MCG
AS DIRECTED TABLET ORAL ONCE A DAY
Qty: 90 | Refills: 2 | Status: ON HOLD | COMMUNITY

## 2024-12-05 RX ORDER — TACROLIMUS 0.3 MG/G
APP TO HANDS AND FEET BID PRN OINTMENT TOPICAL
Qty: 30 | Refills: 0 | Status: ON HOLD | COMMUNITY
Start: 2018-09-19

## 2024-12-05 RX ORDER — ONDANSETRON HYDROCHLORIDE 4 MG/1
1 TABLET TABLET, FILM COATED ORAL
Qty: 30 | Refills: 1 | Status: ON HOLD | COMMUNITY
Start: 2022-07-09

## 2024-12-05 RX ORDER — TACROLIMUS 1 MG/1
9.5 MG CAPSULE, GELATIN COATED ORAL ONCE DAILY
Status: ACTIVE | COMMUNITY

## 2024-12-05 RX ORDER — VALGANCICLOVIR 450 MG/1
1 TABLET WITH A MEAL TABLET, FILM COATED ORAL ONCE A DAY
Status: ACTIVE | COMMUNITY
Start: 2023-08-29

## 2024-12-05 RX ORDER — PREDNISONE 10 MG/1
TABLET ORAL
Qty: 21 | Refills: 0 | Status: ON HOLD | COMMUNITY
Start: 2018-01-30

## 2024-12-05 RX ORDER — SERTRALINE HYDROCHLORIDE 50 MG/1
1 TABLET TABLET, FILM COATED ORAL ONCE A DAY
Qty: 30 | Refills: 3 | Status: ON HOLD | COMMUNITY

## 2024-12-05 RX ORDER — TERBINAFINE HYDROCHLORIDE 250 MG/1
TABLET ORAL
Qty: 30 | Refills: 0 | Status: ON HOLD | COMMUNITY
Start: 2019-08-09

## 2024-12-05 RX ORDER — MYCOPHENOLATE MOFETIL 500 MG/1
2 CAPSULES TABLET, FILM COATED ORAL ONCE A DAY
Status: ACTIVE | COMMUNITY

## 2024-12-05 RX ORDER — PREDNISONE 5 MG/1
1 TABLET TABLET ORAL ONCE A DAY
Status: ON HOLD | COMMUNITY

## 2024-12-05 NOTE — HPI-TODAY'S VISIT:
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  Liver biopsy performed in  revealed acute and chronic portal inflammation.  Hepatitis B and C were negative.  Antimitochondrial antibody was negative anti-smooth muscle antibody was negative.  Eventually she was diagnosed with sclerosing cholangitis.  Last ERCP was in 2019 she is cofollowed at [facility].  She had masslike effect of the right hepatic duct on last ERCP but brushings were negative.  Last colonoscopy was in 2019.  This revealed a 6 mm polyp at the hepatic flexure.  This returned as a hyperplastic polyp with no adenomatous change.  Last upper endoscopy was in 2019.  This was to assess for varices.  Patient had grade 2 esophageal varices at that time.  In the interim patient has been diagnosed with breast cancer.  She has undergone surgery at [facility].  ERCP was repeated again 2020.  Findings were largely unchanged from prior ERCP.  Brushings were negative.  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 She underwent left mastectomy last month at [facility].  Lymph nodes were negative. The patient states she is being considered for breast reconstruction surgery.  She otherwise is feeling quite well.  She is back on medications for her IgG4 disease. Recent ERCP showed nearly complete obstruction of both the right and left hepatic duct with some pus coming out of the left hepatic duct system.  Extensive spyglass biopsies along with brushings were performed.  These were all negative for malignancy.  Laboratory testing in February revealed a total bilirubin of 4.9 alkaline phosphatase of 480  ALT 71.  BUN and creatinine normal.  INR 1.1.  Hemoglobin 9.2 with a low MCV of 71.8 and platelets of 107. ERCP also revealed small gastric varices and esophageal varices. Interval history.  The patient has no new complaints today.  She has numerous questions in regards to what criteria are necessary for her to be listed for transplant.  She asked about coming off of ursodeoxycholic acid which I told her would be okay as it is not felt to be significantly beneficial and sclerosing cholangitis in her diseases so advanced at this point it is unlikely to be providing benefit. Interval history.  The patient was admitted to [facility] last week when she presented for urinary stent change and was found to have a total bilirubin of 13 along with an INR of 4.  Communication from [facility] transplant department stated they have now listed for liver transplant.  They are requesting every 2 week laboratory testing.  I have reviewed extensive records from [facility].  Of also had long discussions with [place] transplant physicians as they are also evaluating her for transplant. Further laboratory testing on admission at [facility] on [facility] 10 revealed INR 4.5 total bilirubin 13.8  ALT 94 alkaline phosphatase 526.  Liver transplant team felt that rifampin was the source of the increasing total bilirubin and they discontinued this.  Her meld score was calculated at 34. The patient states now that she has not been listed at [facility] but they are reconsidering her for transplant.  She did see [place] last week in regards to transplant.  She states laboratory testing there showed a bilirubin to be 15.  She states that [facility] would like a repeat ERCP.  I am going to discuss her case with both [facility] and [place] in regards to the next step.  She Interval history, 2023.  Last ERCP in 2022.  Last CT scan performed last month to follow-up her abscess showed continued fluid collection in the right hepatic dome.  The patient had admissions at [facility] and at [facility] last month for right hepatic abscess.  Drainage catheter was placed at [facility].  Eventually removed.  She developed a transient bile leak percutaneously.  This resolved. The patient states as of last week she was listed for transplant at both [place] and [facility].  Her only complaint currently is morning nausea.  She has been on prednisone chronically 5 mg a day from rheumatology for her IgG4 disease. Interval history, 2023.  The patient underwent ERCP May 4 of this year.  This study was consistent with her prior known PSC.  Brushings were negative for malignancy.  She had grade 2 varices at that time. Patient then underwent liver transplant  of this year.  She received a full size  donation liver with Roma-en-Y biliary anastomosis.  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  Patient received IV Solu-Medrol.  Fluconazole was started to increase tacrolimus levels.  Repeat liver biopsy was then performed on  showing once again moderate acute cellular rejection the patient received another dose of Solu-Medrol.  Repeat liver biopsy on  showed marked improvement.  MRCP on  revealed no ductal dilatation or significant fluid collections. Laboratory testing on  revealed hemoglobin 9.6 which was stable from the week prior.  Platelets of 143 which was slightly lower from 153 the week prior.  White cell count was 4.4.  Potassium was 5.7.  BUN 69 creatinine 2.1.  The week before BUN was 57 and creatinine 1.8.  Alkaline phosphatase decreased from 2 15-1 99 and total bilirubin from 1.1-0.9.  Transaminases were normal.  Albumin was 4.4.  Tacrolimus level was 12.6. The patient states that since transplant she has had persistent problems of nausea and anorexia.  She has no dysphagia.  No vomiting with eating.  She states she simply has no appetite.  She does not have any burning with urination except on rare occasions.  She has no shortness of breath or chest pain.  She does have chills but no fever that she knows of.  She states originally she was hypokalemic and was given magnesium and potassium and now she has become hyperkalemic.  Her BUN pretransplant was 9 and creatinine 0.6. Interval history, 2023.  Laboratory testing from  of this year revealed urine cultures positive for gram-positive jose alfredo.  CMP revealed BUN 50 creatinine 2.3 potassium 6.5 and alkaline phosphatase 154 otherwise normal CMP.  Urinalysis revealed greater than 900 red blood cells.  Elevated white cells. Laboratory testing from  of this year revealed white cell count 4.6 hemoglobin 10.3 which was stable.  Platelets of 117.  BMP unremarkable other than BUN 36 and creatinine 1.2.  Alkaline phosphatase 129 otherwise normal LFTs.  Glucose 111.  Tacrolimus level 8.  CMV negative. Patient was seen in hospital consultation by our service on  for nausea and vomiting status post hepatic transplantation.  Seen by my partner Dr. Atkins. Patient states overall she is doing quite well.  She does have a sensation of decreased sensation around her incision site.  Otherwise she feels quite well.  She follows up with her nephrologist tomorrow.  She states they have been assessing proteinuria Interval history, 2024. Colonoscopy performed  of last year revealed a 3 mm transverse colon polyp.  Pathology of this polyp returned as hyperplastic and she was recommended a 10-year follow-up. Referring records were reviewed.  23 pages of a discharge summary from [facility] from last month revealed the patient underwent surgery for breast cancer.  There is also a note from [facility] that the patient was admitted to the hospital last week.  I do not have further information on that admission. The patient states overall she is doing quite well.  She states she was in [facility] last month for breast implant.  She then had to be readmitted briefly due to an infection that developed on the left lateral aspect.  She states she is going to have her stents in her ureters changed and they are considering removing them completely since she is on immunosuppression for her liver transplant which should treat her IgG4 disease.  She does have some hematuria intermittently.  She states her urologist is aware of this.  She states also she is needing increasing iron infusions.  There is no melena or hematochezia.

## 2024-12-17 ENCOUNTER — OFFICE VISIT (OUTPATIENT)
Dept: URBAN - METROPOLITAN AREA SURGERY CENTER 25 | Facility: SURGERY CENTER | Age: 59
End: 2024-12-17

## 2024-12-23 ENCOUNTER — OFFICE VISIT (OUTPATIENT)
Dept: URBAN - METROPOLITAN AREA CLINIC 113 | Facility: CLINIC | Age: 59
End: 2024-12-23

## 2024-12-31 ENCOUNTER — TELEPHONE ENCOUNTER (OUTPATIENT)
Dept: URBAN - METROPOLITAN AREA CLINIC 113 | Facility: CLINIC | Age: 59
End: 2024-12-31

## 2024-12-31 ENCOUNTER — OFFICE VISIT (OUTPATIENT)
Dept: URBAN - METROPOLITAN AREA SURGERY CENTER 25 | Facility: SURGERY CENTER | Age: 59
End: 2024-12-31

## 2024-12-31 RX ORDER — GUAIFENESIN AND CODEINE PHOSPHATE 100; 10 MG/5ML; MG/5ML
10 ML AS NEEDED SOLUTION ORAL
Qty: 280 ML | Refills: 0 | OUTPATIENT
Start: 2024-12-31 | End: 2025-01-07

## 2024-12-31 RX ORDER — SPIRONOLACTONE 50 MG/1
TABLET, FILM COATED ORAL
Qty: 90 | Refills: 0 | Status: ON HOLD | COMMUNITY
Start: 2018-01-18

## 2024-12-31 RX ORDER — DUPILUMAB 300 MG/2ML
INJECTION, SOLUTION SUBCUTANEOUS
Qty: 4 | Refills: 0 | Status: ON HOLD | COMMUNITY
Start: 2018-11-14

## 2024-12-31 RX ORDER — EVOLOCUMAB 140 MG/ML
BI WEEKLY INJECTION, SOLUTION SUBCUTANEOUS
Refills: 0 | Status: ON HOLD | COMMUNITY

## 2024-12-31 RX ORDER — TACROLIMUS 0.3 MG/G
APP TO HANDS AND FEET BID PRN OINTMENT TOPICAL
Qty: 30 | Refills: 0 | Status: ON HOLD | COMMUNITY
Start: 2018-09-19

## 2024-12-31 RX ORDER — PANTOPRAZOLE SODIUM 40 MG/1
1 TABLET TABLET, DELAYED RELEASE ORAL TWICE A DAY
Status: ON HOLD | COMMUNITY

## 2024-12-31 RX ORDER — TRIAMCINOLONE ACETONIDE 1 MG/G
CREAM TOPICAL
Qty: 454 | Refills: 0 | Status: ON HOLD | COMMUNITY
Start: 2019-03-14

## 2024-12-31 RX ORDER — EVOLOCUMAB 140 MG/ML
INJECTION, SOLUTION SUBCUTANEOUS
Qty: 6 | Refills: 0 | Status: ON HOLD | COMMUNITY
Start: 2018-04-03

## 2024-12-31 RX ORDER — ONDANSETRON HYDROCHLORIDE 4 MG/1
1 TABLET TABLET, FILM COATED ORAL
Qty: 30 | Refills: 1 | Status: ON HOLD | COMMUNITY
Start: 2022-07-09

## 2024-12-31 RX ORDER — TRAZODONE HYDROCHLORIDE 100 MG/1
1 TABLET AT BEDTIME TABLET ORAL ONCE A DAY
Qty: 30 | Refills: 3 | Status: ON HOLD | COMMUNITY
Start: 2023-08-30

## 2024-12-31 RX ORDER — PHYTONADIONE (VIT K1) 100 MCG
AS DIRECTED TABLET ORAL ONCE A DAY
Qty: 90 | Refills: 2 | Status: ON HOLD | COMMUNITY

## 2024-12-31 RX ORDER — MYCOPHENOLATE MOFETIL 500 MG/1
2 CAPSULES TABLET, FILM COATED ORAL ONCE A DAY
Status: ACTIVE | COMMUNITY

## 2024-12-31 RX ORDER — PREDNISONE 10 MG/1
TABLET ORAL
Qty: 21 | Refills: 0 | Status: ON HOLD | COMMUNITY
Start: 2018-01-30

## 2024-12-31 RX ORDER — TACROLIMUS 1 MG/1
9.5 MG CAPSULE, GELATIN COATED ORAL ONCE DAILY
Status: ACTIVE | COMMUNITY

## 2024-12-31 RX ORDER — SERTRALINE HYDROCHLORIDE 50 MG/1
1 TABLET TABLET, FILM COATED ORAL ONCE A DAY
Qty: 30 | Refills: 3 | Status: ON HOLD | COMMUNITY

## 2024-12-31 RX ORDER — CODEINE PHOSPHATE AND GUAIFENESIN 10; 100 MG/5ML; MG/5ML
LIQUID ORAL
Qty: 200 | Refills: 0 | Status: ON HOLD | COMMUNITY
Start: 2019-10-16

## 2024-12-31 RX ORDER — CHOLECALCIFEROL (VITAMIN D3) 1250 MCG
TK 1 C PO 1 TIME Q WK CAPSULE ORAL
Qty: 4 | Refills: 0 | Status: ON HOLD | COMMUNITY
Start: 2019-07-10

## 2024-12-31 RX ORDER — VALGANCICLOVIR 450 MG/1
1 TABLET WITH A MEAL TABLET, FILM COATED ORAL ONCE A DAY
Status: ACTIVE | COMMUNITY
Start: 2023-08-29

## 2024-12-31 RX ORDER — PREDNISONE 5 MG/1
1 TABLET TABLET ORAL ONCE A DAY
Status: ON HOLD | COMMUNITY

## 2024-12-31 RX ORDER — FLUCONAZOLE 100 MG/1
1 TABLET TABLET ORAL DAILY
Status: ON HOLD | COMMUNITY
Start: 2023-08-29

## 2024-12-31 RX ORDER — TERBINAFINE HYDROCHLORIDE 250 MG/1
TABLET ORAL
Qty: 30 | Refills: 0 | Status: ON HOLD | COMMUNITY
Start: 2019-08-09